# Patient Record
Sex: FEMALE | Race: WHITE | NOT HISPANIC OR LATINO | Employment: OTHER | URBAN - METROPOLITAN AREA
[De-identification: names, ages, dates, MRNs, and addresses within clinical notes are randomized per-mention and may not be internally consistent; named-entity substitution may affect disease eponyms.]

---

## 2017-11-09 ENCOUNTER — GENERIC CONVERSION - ENCOUNTER (OUTPATIENT)
Dept: OTHER | Facility: OTHER | Age: 57
End: 2017-11-09

## 2018-10-30 ENCOUNTER — APPOINTMENT (EMERGENCY)
Dept: CT IMAGING | Facility: HOSPITAL | Age: 58
End: 2018-10-30

## 2018-10-30 ENCOUNTER — HOSPITAL ENCOUNTER (EMERGENCY)
Facility: HOSPITAL | Age: 58
Discharge: HOME | End: 2018-10-30
Attending: EMERGENCY MEDICINE | Admitting: EMERGENCY MEDICINE

## 2018-10-30 VITALS
OXYGEN SATURATION: 97 % | BODY MASS INDEX: 29.23 KG/M2 | HEART RATE: 81 BPM | SYSTOLIC BLOOD PRESSURE: 110 MMHG | TEMPERATURE: 98.7 F | RESPIRATION RATE: 18 BRPM | DIASTOLIC BLOOD PRESSURE: 57 MMHG | WEIGHT: 165 LBS

## 2018-10-30 DIAGNOSIS — M54.9 BACK PAIN: Primary | ICD-10-CM

## 2018-10-30 LAB
ALBUMIN SERPL BCP-MCNC: 3.7 G/DL (ref 3.5–5)
ALP SERPL-CCNC: 122 U/L (ref 46–116)
ALT SERPL W P-5'-P-CCNC: 16 U/L (ref 12–78)
ANION GAP SERPL CALCULATED.3IONS-SCNC: 8 MMOL/L (ref 4–13)
APTT PPP: 29 SECONDS (ref 24–36)
AST SERPL W P-5'-P-CCNC: 14 U/L (ref 5–45)
BASOPHILS # BLD AUTO: 0.04 THOUSANDS/ΜL (ref 0–0.1)
BASOPHILS NFR BLD AUTO: 0 % (ref 0–1)
BILIRUB SERPL-MCNC: 0.1 MG/DL (ref 0.2–1)
BUN SERPL-MCNC: 18 MG/DL (ref 5–25)
CALCIUM SERPL-MCNC: 9.6 MG/DL (ref 8.3–10.1)
CHLORIDE SERPL-SCNC: 105 MMOL/L (ref 100–108)
CO2 SERPL-SCNC: 30 MMOL/L (ref 21–32)
CREAT SERPL-MCNC: 0.71 MG/DL (ref 0.6–1.3)
EOSINOPHIL # BLD AUTO: 0.31 THOUSAND/ΜL (ref 0–0.61)
EOSINOPHIL NFR BLD AUTO: 2 % (ref 0–6)
ERYTHROCYTE [DISTWIDTH] IN BLOOD BY AUTOMATED COUNT: 13.2 % (ref 11.6–15.1)
GFR SERPL CREATININE-BSD FRML MDRD: 96 ML/MIN/1.73SQ M
GLUCOSE SERPL-MCNC: 94 MG/DL (ref 65–140)
HCT VFR BLD AUTO: 43.1 % (ref 34.8–46.1)
HGB BLD-MCNC: 14.5 G/DL (ref 11.5–15.4)
IMM GRANULOCYTES # BLD AUTO: 0.05 THOUSAND/UL (ref 0–0.2)
IMM GRANULOCYTES NFR BLD AUTO: 0 % (ref 0–2)
INR PPP: 0.91 (ref 0.86–1.17)
LIPASE SERPL-CCNC: 112 U/L (ref 73–393)
LYMPHOCYTES # BLD AUTO: 3.41 THOUSANDS/ΜL (ref 0.6–4.47)
LYMPHOCYTES NFR BLD AUTO: 21 % (ref 14–44)
MCH RBC QN AUTO: 30.9 PG (ref 26.8–34.3)
MCHC RBC AUTO-ENTMCNC: 33.6 G/DL (ref 31.4–37.4)
MCV RBC AUTO: 92 FL (ref 82–98)
MONOCYTES # BLD AUTO: 1.26 THOUSAND/ΜL (ref 0.17–1.22)
MONOCYTES NFR BLD AUTO: 8 % (ref 4–12)
NEUTROPHILS # BLD AUTO: 11.14 THOUSANDS/ΜL (ref 1.85–7.62)
NEUTS SEG NFR BLD AUTO: 69 % (ref 43–75)
NRBC BLD AUTO-RTO: 0 /100 WBCS
PLATELET # BLD AUTO: 305 THOUSANDS/UL (ref 149–390)
PMV BLD AUTO: 9.8 FL (ref 8.9–12.7)
POTASSIUM SERPL-SCNC: 3.8 MMOL/L (ref 3.5–5.3)
PROT SERPL-MCNC: 7.1 G/DL (ref 6.4–8.2)
PROTHROMBIN TIME: 12.2 SECONDS (ref 11.8–14.2)
RBC # BLD AUTO: 4.7 MILLION/UL (ref 3.81–5.12)
SODIUM SERPL-SCNC: 143 MMOL/L (ref 136–145)
WBC # BLD AUTO: 16.21 THOUSAND/UL (ref 4.31–10.16)

## 2018-10-30 PROCEDURE — 83690 ASSAY OF LIPASE: CPT | Performed by: EMERGENCY MEDICINE

## 2018-10-30 PROCEDURE — 85730 THROMBOPLASTIN TIME PARTIAL: CPT | Performed by: EMERGENCY MEDICINE

## 2018-10-30 PROCEDURE — 85025 COMPLETE CBC W/AUTO DIFF WBC: CPT | Performed by: EMERGENCY MEDICINE

## 2018-10-30 PROCEDURE — 99284 EMERGENCY DEPT VISIT MOD MDM: CPT

## 2018-10-30 PROCEDURE — 85610 PROTHROMBIN TIME: CPT | Performed by: EMERGENCY MEDICINE

## 2018-10-30 PROCEDURE — 74177 CT ABD & PELVIS W/CONTRAST: CPT

## 2018-10-30 PROCEDURE — 80053 COMPREHEN METABOLIC PANEL: CPT | Performed by: EMERGENCY MEDICINE

## 2018-10-30 PROCEDURE — 36415 COLL VENOUS BLD VENIPUNCTURE: CPT | Performed by: EMERGENCY MEDICINE

## 2018-10-30 RX ORDER — LIDOCAINE 50 MG/G
1 PATCH TOPICAL DAILY
Qty: 30 PATCH | Refills: 0 | Status: SHIPPED | OUTPATIENT
Start: 2018-10-30 | End: 2018-10-30

## 2018-10-30 RX ORDER — LIDOCAINE 50 MG/G
1 PATCH TOPICAL DAILY
Qty: 30 PATCH | Refills: 0 | Status: SHIPPED | OUTPATIENT
Start: 2018-10-30

## 2018-10-30 RX ADMIN — IOHEXOL 100 ML: 350 INJECTION, SOLUTION INTRAVENOUS at 22:34

## 2018-10-31 NOTE — DISCHARGE INSTRUCTIONS
Acute Low Back Pain   WHAT YOU NEED TO KNOW:   Acute low back pain is sudden discomfort in your lower back area that lasts for up to 6 weeks  The discomfort makes it difficult to tolerate activity  DISCHARGE INSTRUCTIONS:   Return to the emergency department if:   · You have severe pain  · You have sudden stiffness and heaviness on both buttocks down to both legs  · You have numbness or weakness in one leg, or pain in both legs  · You have numbness in your genital area or across your lower back  · You cannot control your urine or bowel movements  Contact your healthcare provider if:   · You have a fever  · You have pain at night or when you rest     · Your pain does not get better with treatment  · You have pain that worsens when you cough or sneeze  · You suddenly feel something pop or snap in your back  · You have questions or concerns about your condition or care  Medicines: The following medicines may be ordered by your healthcare provider:  · Acetaminophen  decreases pain  It is available without a doctor's order  Ask how much to take and how often to take it  Follow directions  Acetaminophen can cause liver damage if not taken correctly  · NSAIDs  help decrease swelling and pain  This medicine is available with or without a doctor's order  NSAIDs can cause stomach bleeding or kidney problems in certain people  If you take blood thinner medicine, always ask your healthcare provider if NSAIDs are safe for you  Always read the medicine label and follow directions  · Prescription pain medicine  may be given  Ask your healthcare provider how to take this medicine safely  · Muscle relaxers  decrease pain by relaxing the muscles in your lower spine  · Take your medicine as directed  Contact your healthcare provider if you think your medicine is not helping or if you have side effects  Tell him of her if you are allergic to any medicine   Keep a list of the medicines, vitamins, and herbs you take  Include the amounts, and when and why you take them  Bring the list or the pill bottles to follow-up visits  Carry your medicine list with you in case of an emergency  Self-care:   · Stay active  as much as you can without causing more pain  Bed rest could make your back pain worse  Start with some light exercises such as walking  Avoid heavy lifting until your pain is gone  Ask for more information about the activities or exercises that are right for you  · Ice  helps decrease swelling, pain, and muscle spams  Put crushed ice in a plastic bag  Cover it with a towel  Place it on your lower back for 20 to 30 minutes every 2 hours  Do this for about 2 to 3 days after your pain starts, or as directed  · Heat  helps decrease pain and muscle spasms  Start to use heat after treatment with ice has stopped  Use a small towel dampened with warm water or a heating pad, or sit in a warm bath  Apply heat on the area for 20 to 30 minutes every 2 hours for as many days as directed  Alternate heat and ice  Prevent acute low back pain:   · Use proper body mechanics  ¨ Bend at the hips and knees when you  objects  Do not bend from the waist  Use your leg muscles as you lift the load  Do not use your back  Keep the object close to your chest as you lift it  Try not to twist or lift anything above your waist     ¨ Change your position often when you stand for long periods of time  Rest one foot on a small box or footrest, and then switch to the other foot often  ¨ Try not to sit for long periods of time  When you do, sit in a straight-backed chair with your feet flat on the floor  Never reach, pull, or push while you are sitting  · Do exercises that strengthen your back muscles  Warm up before you exercise  Ask your healthcare provider the best exercises for you  · Maintain a healthy weight  Ask your healthcare provider how much you should weigh   Ask him to help you create a weight loss plan if you are overweight  Follow up with your healthcare provider as directed:  Return for a follow-up visit if you still have pain after 1 to 3 weeks of treatment  You may need to visit an orthopedist if your back pain lasts more than 12 weeks  Write down your questions so you remember to ask them during your visits  © 2017 [employer]  Information is for End User's use only and may not be sold, redistributed or otherwise used for commercial purposes  All illustrations and images included in CareNotes® are the copyrighted property of [employer]  or [employer]  The above information is an  only  It is not intended as medical advice for individual conditions or treatments  Talk to your doctor, nurse or pharmacist before following any medical regimen to see if it is safe and effective for you

## 2018-10-31 NOTE — ED PROVIDER NOTES
History  Chief Complaint   Patient presents with    Back Pain     Left sided back over the past few months  Worse at times  Several months progressively worsening L flank pain, intermittent, worse w bending and movement  No preceding trauma  No CP or dyspnea or hemoptysis  Pain radiates to L abdomen and low back  No numbness or weakness  No rash  No other symptoms  She is concerned she might have malignancy as etiology of pain (strong [facility] h/o malignancy) and therefore requesting evaluation for possible cancer  None       History reviewed  No pertinent past medical history  Past Surgical History:   Procedure Laterality Date    HEMORROIDECTOMY      VEIN LIGATION AND STRIPPING         History reviewed  No pertinent family history  I have reviewed and agree with the history as documented  Social History   Substance Use Topics    Smoking status: Current Every Day Smoker     Packs/day: 1 00    Smokeless tobacco: Never Used    Alcohol use No        Review of Systems   Musculoskeletal: Positive for back pain  All other systems reviewed and are negative  Physical Exam  Physical Exam   Constitutional: She is oriented to person, place, and time  She appears well-developed and well-nourished  No distress  HENT:   Head: Normocephalic and atraumatic  Eyes: Pupils are equal, round, and reactive to light  Conjunctivae and EOM are normal    Neck: Normal range of motion  Neck supple  No JVD present  Cardiovascular: Normal rate, regular rhythm, normal heart sounds and intact distal pulses  Pulmonary/Chest: Effort normal and breath sounds normal  No stridor  Abdominal: Soft  She exhibits no distension  There is no tenderness  There is no rebound and no guarding  L paraspinal tenderness in T and L spine region  No ecchymosis erythema crepitus or discoloration of skin  Musculoskeletal: Normal range of motion  She exhibits no edema, tenderness or deformity     Neurological: She is alert and oriented to person, place, and time  No cranial nerve deficit or sensory deficit  She exhibits normal muscle tone  Coordination normal    Skin: Skin is warm and dry  Capillary refill takes less than 2 seconds  No rash noted  She is not diaphoretic  No erythema  No pallor  Nursing note and vitals reviewed        Vital Signs  ED Triage Vitals [10/30/18 1949]   Temperature Pulse Respirations Blood Pressure SpO2   98 7 °F (37 1 °C) (!) 110 18 142/89 98 %      Temp Source Heart Rate Source Patient Position - Orthostatic VS BP Location FiO2 (%)   Oral Monitor Sitting Left arm --      Pain Score       Worst Possible Pain           Vitals:    10/30/18 1949 10/30/18 2248   BP: 142/89 110/57   Pulse: (!) 110 81   Patient Position - Orthostatic VS: Sitting Lying       Visual Acuity      ED Medications  Medications   iohexol (OMNIPAQUE) 350 MG/ML injection (MULTI-DOSE) 100 mL (100 mL Intravenous Given 10/30/18 2234)       Diagnostic Studies  Results Reviewed     Procedure Component Value Units Date/Time    Lipase [[id number]]  (Normal) Collected:  10/30/18 2121    Lab Status:  Final result Specimen:  Blood from Arm, Right Updated:  10/30/18 2222     Lipase 112 u/L     Comprehensive metabolic panel [[id number]]  (Abnormal) Collected:  10/30/18 2121    Lab Status:  Final result Specimen:  Blood from Arm, Right Updated:  10/30/18 2222     Sodium 143 mmol/L      Potassium 3 8 mmol/L      Chloride 105 mmol/L      CO2 30 mmol/L      ANION GAP 8 mmol/L      BUN 18 mg/dL      Creatinine 0 71 mg/dL      Glucose 94 mg/dL      Calcium 9 6 mg/dL      AST 14 U/L      ALT 16 U/L      Alkaline Phosphatase 122 (H) U/L      Total Protein 7 1 g/dL      Albumin 3 7 g/dL      Total Bilirubin 0 10 (L) mg/dL      eGFR 96 ml/min/1 73sq m     Narrative:         National Kidney Disease Education Program recommendations are as follows:  GFR calculation is accurate only with a steady state creatinine  Chronic Kidney disease less than 60 ml/min/1 73 sq  meters  Kidney failure less than 15 ml/min/1 73 sq  meters  Protime-INR [[id number]]  (Normal) Collected:  10/30/18 2121    Lab Status:  Final result Specimen:  Blood from Arm, Right Updated:  10/30/18 2138     Protime 12 2 seconds      INR 0 91    APTT [[id number]]  (Normal) Collected:  10/30/18 2121    Lab Status:  Final result Specimen:  Blood from Arm, Right Updated:  10/30/18 2138     PTT 29 seconds     CBC and differential [[id number]]  (Abnormal) Collected:  10/30/18 2121    Lab Status:  Final result Specimen:  Blood from Arm, Right Updated:  10/30/18 2127     WBC 16 21 (H) Thousand/uL      RBC 4 70 Million/uL      Hemoglobin 14 5 g/dL      Hematocrit 43 1 %      MCV 92 fL      MCH 30 9 pg      MCHC 33 6 g/dL      RDW 13 2 %      MPV 9 8 fL      Platelets 632 Thousands/uL      nRBC 0 /100 WBCs      Neutrophils Relative 69 %      Immat GRANS % 0 %      Lymphocytes Relative 21 %      Monocytes Relative 8 %      Eosinophils Relative 2 %      Basophils Relative 0 %      Neutrophils Absolute 11 14 (H) Thousands/µL      Immature Grans Absolute 0 05 Thousand/uL      Lymphocytes Absolute 3 41 Thousands/µL      Monocytes Absolute 1 26 (H) Thousand/µL      Eosinophils Absolute 0 31 Thousand/µL      Basophils Absolute 0 04 Thousands/µL                  CT abdomen pelvis with contrast   Final Result by Jenni Aviles DO (10/30 2257)      No acute findings  Workstation performed: [id number]                    Procedures  Procedures       Phone Contacts  ED Phone Contact    ED Course                               MDM  Number of Diagnoses or Management Options  Back pain:   Diagnosis management comments: Several months of back pain, concerned of malignancy given recent multiple family dxs of same  Labs and ct without emergent findings   Plan - d/c home, f/u pcp for further testing/imaging/etc         Amount and/or Complexity of Data Reviewed  Clinical lab tests: ordered and reviewed  Tests in the radiology section of CPT®: ordered and reviewed  Tests in the medicine section of CPT®: reviewed and ordered  Independent visualization of images, tracings, or specimens: yes      CritCare Time    Disposition  Final diagnoses:   Back pain     Time reflects when diagnosis was documented in both MDM as applicable and the Disposition within this note     Time User Action Codes Description Comment    10/30/2018 11:05 PM Nathen Bernardo Add [M54 9] Back pain       ED Disposition     ED Disposition Condition Comment    Discharge  Bronwyn Santos discharge to home/self care  Condition at discharge: Stable        Follow-up Information    None         There are no discharge medications for this patient  No discharge procedures on file      ED Provider  Electronically Signed by           Loren Rivas MD  10/31/18 1242

## 2024-08-11 ENCOUNTER — HOSPITAL ENCOUNTER (EMERGENCY)
Facility: HOSPITAL | Age: 64
Discharge: HOME | End: 2024-08-12

## 2024-08-11 ENCOUNTER — APPOINTMENT (EMERGENCY)
Dept: CT IMAGING | Facility: HOSPITAL | Age: 64
End: 2024-08-11

## 2024-08-11 VITALS
DIASTOLIC BLOOD PRESSURE: 53 MMHG | RESPIRATION RATE: 20 BRPM | BODY MASS INDEX: 35.27 KG/M2 | TEMPERATURE: 98.5 F | HEART RATE: 76 BPM | HEIGHT: 63 IN | WEIGHT: 199.08 LBS | OXYGEN SATURATION: 96 % | SYSTOLIC BLOOD PRESSURE: 105 MMHG

## 2024-08-11 DIAGNOSIS — J44.9 COPD (CHRONIC OBSTRUCTIVE PULMONARY DISEASE) (HCC): ICD-10-CM

## 2024-08-11 DIAGNOSIS — K59.00 CONSTIPATION: ICD-10-CM

## 2024-08-11 DIAGNOSIS — J04.0 LARYNGITIS: Primary | ICD-10-CM

## 2024-08-11 LAB
ALBUMIN SERPL BCG-MCNC: 4 G/DL (ref 3.5–5)
ALP SERPL-CCNC: 91 U/L (ref 34–104)
ALT SERPL W P-5'-P-CCNC: 15 U/L (ref 7–52)
ANION GAP SERPL CALCULATED.3IONS-SCNC: 6 MMOL/L (ref 4–13)
AST SERPL W P-5'-P-CCNC: 12 U/L (ref 13–39)
BASOPHILS # BLD AUTO: 0.02 THOUSANDS/ÂΜL (ref 0–0.1)
BASOPHILS NFR BLD AUTO: 0 % (ref 0–1)
BILIRUB SERPL-MCNC: 0.27 MG/DL (ref 0.2–1)
BILIRUB UR QL STRIP: NEGATIVE
BUN SERPL-MCNC: 22 MG/DL (ref 5–25)
CALCIUM SERPL-MCNC: 9 MG/DL (ref 8.4–10.2)
CARDIAC TROPONIN I PNL SERPL HS: 3 NG/L
CHLORIDE SERPL-SCNC: 104 MMOL/L (ref 96–108)
CLARITY UR: CLEAR
CO2 SERPL-SCNC: 28 MMOL/L (ref 21–32)
COLOR UR: YELLOW
CREAT SERPL-MCNC: 0.8 MG/DL (ref 0.6–1.3)
EOSINOPHIL # BLD AUTO: 0.05 THOUSAND/ÂΜL (ref 0–0.61)
EOSINOPHIL NFR BLD AUTO: 0 % (ref 0–6)
ERYTHROCYTE [DISTWIDTH] IN BLOOD BY AUTOMATED COUNT: 14 % (ref 11.6–15.1)
FLUAV RNA RESP QL NAA+PROBE: NEGATIVE
FLUBV RNA RESP QL NAA+PROBE: NEGATIVE
GFR SERPL CREATININE-BSD FRML MDRD: 79 ML/MIN/1.73SQ M
GLUCOSE SERPL-MCNC: 98 MG/DL (ref 65–140)
GLUCOSE UR STRIP-MCNC: NEGATIVE MG/DL
HCT VFR BLD AUTO: 40.7 % (ref 34.8–46.1)
HGB BLD-MCNC: 13.4 G/DL (ref 11.5–15.4)
HGB UR QL STRIP.AUTO: NEGATIVE
IMM GRANULOCYTES # BLD AUTO: 0.15 THOUSAND/UL (ref 0–0.2)
IMM GRANULOCYTES NFR BLD AUTO: 1 % (ref 0–2)
KETONES UR STRIP-MCNC: NEGATIVE MG/DL
LEUKOCYTE ESTERASE UR QL STRIP: NEGATIVE
LIPASE SERPL-CCNC: 125 U/L (ref 11–82)
LYMPHOCYTES # BLD AUTO: 3.19 THOUSANDS/ÂΜL (ref 0.6–4.47)
LYMPHOCYTES NFR BLD AUTO: 17 % (ref 14–44)
MCH RBC QN AUTO: 30.2 PG (ref 26.8–34.3)
MCHC RBC AUTO-ENTMCNC: 32.9 G/DL (ref 31.4–37.4)
MCV RBC AUTO: 92 FL (ref 82–98)
MONOCYTES # BLD AUTO: 1.64 THOUSAND/ÂΜL (ref 0.17–1.22)
MONOCYTES NFR BLD AUTO: 9 % (ref 4–12)
NEUTROPHILS # BLD AUTO: 13.25 THOUSANDS/ÂΜL (ref 1.85–7.62)
NEUTS SEG NFR BLD AUTO: 73 % (ref 43–75)
NITRITE UR QL STRIP: NEGATIVE
NRBC BLD AUTO-RTO: 0 /100 WBCS
PH UR STRIP.AUTO: 6.5 [PH]
PLATELET # BLD AUTO: 289 THOUSANDS/UL (ref 149–390)
PMV BLD AUTO: 9.3 FL (ref 8.9–12.7)
POTASSIUM SERPL-SCNC: 4.1 MMOL/L (ref 3.5–5.3)
PROT SERPL-MCNC: 6.4 G/DL (ref 6.4–8.4)
PROT UR STRIP-MCNC: NEGATIVE MG/DL
RBC # BLD AUTO: 4.44 MILLION/UL (ref 3.81–5.12)
RSV RNA RESP QL NAA+PROBE: NEGATIVE
SARS-COV-2 RNA RESP QL NAA+PROBE: NEGATIVE
SODIUM SERPL-SCNC: 138 MMOL/L (ref 135–147)
SP GR UR STRIP.AUTO: 1.02
TSH SERPL DL<=0.05 MIU/L-ACNC: 0.12 UIU/ML (ref 0.45–4.5)
UROBILINOGEN UR QL STRIP.AUTO: 0.2 E.U./DL
WBC # BLD AUTO: 18.3 THOUSAND/UL (ref 4.31–10.16)

## 2024-08-11 PROCEDURE — 84484 ASSAY OF TROPONIN QUANT: CPT

## 2024-08-11 PROCEDURE — 93005 ELECTROCARDIOGRAM TRACING: CPT

## 2024-08-11 PROCEDURE — 74177 CT ABD & PELVIS W/CONTRAST: CPT

## 2024-08-11 PROCEDURE — 84439 ASSAY OF FREE THYROXINE: CPT

## 2024-08-11 PROCEDURE — 83690 ASSAY OF LIPASE: CPT

## 2024-08-11 PROCEDURE — 84443 ASSAY THYROID STIM HORMONE: CPT

## 2024-08-11 PROCEDURE — 80053 COMPREHEN METABOLIC PANEL: CPT

## 2024-08-11 PROCEDURE — 36415 COLL VENOUS BLD VENIPUNCTURE: CPT

## 2024-08-11 PROCEDURE — 99285 EMERGENCY DEPT VISIT HI MDM: CPT

## 2024-08-11 PROCEDURE — 0241U HB NFCT DS VIR RESP RNA 4 TRGT: CPT

## 2024-08-11 PROCEDURE — 94640 AIRWAY INHALATION TREATMENT: CPT

## 2024-08-11 PROCEDURE — 70491 CT SOFT TISSUE NECK W/DYE: CPT

## 2024-08-11 PROCEDURE — 81003 URINALYSIS AUTO W/O SCOPE: CPT

## 2024-08-11 PROCEDURE — 71260 CT THORAX DX C+: CPT

## 2024-08-11 PROCEDURE — 85025 COMPLETE CBC W/AUTO DIFF WBC: CPT

## 2024-08-11 RX ORDER — IPRATROPIUM BROMIDE AND ALBUTEROL SULFATE 2.5; .5 MG/3ML; MG/3ML
3 SOLUTION RESPIRATORY (INHALATION) ONCE
Status: COMPLETED | OUTPATIENT
Start: 2024-08-11 | End: 2024-08-11

## 2024-08-11 RX ADMIN — IOHEXOL 100 ML: 350 INJECTION, SOLUTION INTRAVENOUS at 22:39

## 2024-08-11 RX ADMIN — IPRATROPIUM BROMIDE AND ALBUTEROL SULFATE 3 ML: 2.5; .5 SOLUTION RESPIRATORY (INHALATION) at 22:03

## 2024-08-12 LAB
ATRIAL RATE: 81 BPM
P AXIS: 35 DEGREES
PR INTERVAL: 136 MS
QRS AXIS: 68 DEGREES
QRSD INTERVAL: 78 MS
QT INTERVAL: 374 MS
QTC INTERVAL: 434 MS
T WAVE AXIS: 61 DEGREES
T4 FREE SERPL-MCNC: 0.79 NG/DL (ref 0.61–1.12)
VENTRICULAR RATE: 81 BPM

## 2024-08-12 PROCEDURE — 93010 ELECTROCARDIOGRAM REPORT: CPT | Performed by: INTERNAL MEDICINE

## 2024-08-12 NOTE — ED PROVIDER NOTES
"History  Chief Complaint   Patient presents with    Sore Throat     Started about 6 weeks ago. C/O sore throat, chest tightness, ear pain abd pain, back pain and dizziness. No chills, no fevers.      HPI    Patient is a 63 year old female with PMHx cervical cancer, anxiety, DVT, Depression, HLD, HTN, presenting to the ED for evaluation of multiple complaints. Patient states that for the past 2-3 months, she has been having intermittent sore throat and throat tightening sensation \"as if there is something stuck there\", with persistent nasal congestion and dry cough. She also feels associated chest tightness that is unrelated to exertion, with intermittent shortness of breath. Patient also notes in the same time frame having intermittent diffuse abdominal pain and bloating. She reports difficulty with bowel movements and also notes difficulty urinating at times. Reports chronic history of back pain. Feels lightheaded at times as well. She also notes bilateral ear pressure. Patient states that she has not been to her family doctor for any of these symptoms. Her sister at bedside encouraged her to be seen in the ED tonight because \"I didn't want her to have a heart attack in her sleep.\" Patient states that she has been putting off getting evaluated for these complaints because she is nervous.    Specifically patient denies - fevers, chills, pleuritic component to her chest tightness or shortness of breath, trauma, recent falls, difficulty swallowing, difficulty speaking, headaches, vision changes, neck pain, bloody stools, melena, hematuria, vaginal bleeding, peripheral edema, calf pain or swelling, urinary frequency and urgency, paresthesias, focal extremity weakness.    Prior to Admission Medications   Prescriptions Last Dose Informant Patient Reported? Taking?   lidocaine (LIDODERM) 5 %   No No   Sig: Apply 1 patch topically daily Remove & Discard patch within 12 hours or as directed by MD      Facility-Administered " Medications: None       History reviewed. No pertinent past medical history.    Past Surgical History:   Procedure Laterality Date    HEMORROIDECTOMY      VEIN LIGATION AND STRIPPING         Family History   Problem Relation Age of Onset    Heart disease Father     Lung cancer Father      I have reviewed and agree with the history as documented.    E-Cigarette/Vaping    E-Cigarette Use Never User      E-Cigarette/Vaping Substances    Nicotine No     THC No     CBD No     Flavoring No     Other No     Unknown No      Social History     Tobacco Use    Smoking status: Every Day     Current packs/day: 1.00     Types: Cigarettes    Smokeless tobacco: Never   Vaping Use    Vaping status: Never Used   Substance Use Topics    Alcohol use: No    Drug use: Never       Review of Systems   All other systems reviewed and are negative.      Physical Exam  Physical Exam  Vitals and nursing note reviewed.   Constitutional:       General: She is not in acute distress.     Appearance: She is well-developed. She is obese. She is not ill-appearing, toxic-appearing or diaphoretic.   HENT:      Head: Normocephalic and atraumatic.      Right Ear: Tympanic membrane and ear canal normal.      Left Ear: Tympanic membrane and ear canal normal.      Nose: Congestion present. No rhinorrhea.      Mouth/Throat:      Mouth: Mucous membranes are moist.      Pharynx: Oropharynx is clear. Uvula midline. No pharyngeal swelling, oropharyngeal exudate, posterior oropharyngeal erythema or uvula swelling.      Tonsils: No tonsillar exudate or tonsillar abscesses.      Comments: Floor of the mouth is soft    Eyes:      Extraocular Movements:      Right eye: Normal extraocular motion.      Left eye: Normal extraocular motion.      Conjunctiva/sclera: Conjunctivae normal.   Neck:      Comments: No palpable masses or soft tissue edema. No tenderness to soft tissues of neck. Trachea is midline    Cardiovascular:      Rate and Rhythm: Normal rate and regular  rhythm.      Heart sounds: Normal heart sounds. No murmur heard.  Pulmonary:      Effort: Pulmonary effort is normal. No respiratory distress.      Breath sounds: Wheezing (upper) present.   Abdominal:      General: There is distension (mild).      Palpations: Abdomen is soft. There is no mass.      Tenderness: There is abdominal tenderness (diffuse non-focal). There is no guarding or rebound.      Hernia: No hernia is present.   Musculoskeletal:         General: No swelling.      Cervical back: Normal range of motion and neck supple.   Lymphadenopathy:      Cervical: No cervical adenopathy.   Skin:     General: Skin is warm and dry.      Capillary Refill: Capillary refill takes less than 2 seconds.      Findings: No erythema.   Neurological:      General: No focal deficit present.      Mental Status: She is alert and oriented to person, place, and time.   Psychiatric:         Mood and Affect: Mood normal.         Vital Signs  ED Triage Vitals [08/11/24 2127]   Temperature Pulse Respirations Blood Pressure SpO2   98.5 °F (36.9 °C) 93 20 159/76 97 %      Temp Source Heart Rate Source Patient Position - Orthostatic VS BP Location FiO2 (%)   Temporal Monitor Sitting Left arm --      Pain Score       1           Vitals:    08/11/24 2127 08/11/24 2317   BP: 159/76 105/53   Pulse: 93 76   Patient Position - Orthostatic VS: Sitting Sitting         Visual Acuity      ED Medications  Medications   ipratropium-albuterol (DUO-NEB) 0.5-2.5 mg/3 mL inhalation solution 3 mL (3 mL Nebulization Given 8/11/24 2203)   iohexol (OMNIPAQUE) 350 MG/ML injection (MULTI-DOSE) 100 mL (100 mL Intravenous Given 8/11/24 2239)       Diagnostic Studies  Results Reviewed       Procedure Component Value Units Date/Time    UA w Reflex to Microscopic w Reflex to Culture [[id number]]  (Normal) Collected: 08/11/24 2230    Lab Status: Final result Specimen: Urine, Clean Catch Updated: 08/11/24 2245     Color, UA Yellow     Clarity, UA Clear     Specific  Gravity, UA 1.025     pH, UA 6.5     Leukocytes, UA Negative     Nitrite, UA Negative     Protein, UA Negative mg/dl      Glucose, UA Negative mg/dl      Ketones, UA Negative mg/dl      Urobilinogen, UA 0.2 E.U./dl      Bilirubin, UA Negative     Occult Blood, UA Negative    FLU/RSV/COVID - if FLU/RSV clinically relevant [[id number]]  (Normal) Collected: 08/11/24 2152    Lab Status: Final result Specimen: Nares from Nose Updated: 08/11/24 2239     SARS-CoV-2 Negative     INFLUENZA A PCR Negative     INFLUENZA B PCR Negative     RSV PCR Negative    Narrative:      FOR PEDIATRIC PATIENTS - copy/paste COVID Guidelines URL to browser: [email or web address]    SARS-CoV-2 assay is a Nucleic Acid Amplification assay intended for the  qualitative detection of nucleic acid from SARS-CoV-2 in nasopharyngeal  swabs. Results are for the presumptive identification of SARS-CoV-2 RNA.    Positive results are indicative of infection with SARS-CoV-2, the virus  causing COVID-19, but do not rule out bacterial infection or co-infection  with other viruses. Laboratories within the [place] and its  territories are required to report all positive results to the appropriate  public health authorities. Negative results do not preclude SARS-CoV-2  infection and should not be used as the sole basis for treatment or other  patient management decisions. Negative results must be combined with  clinical observations, patient history, and epidemiological information.  This test has not been FDA cleared or approved.    This test has been authorized by FDA under an Emergency Use Authorization  (EUA). This test is only authorized for the duration of time the  declaration that circumstances exist justifying the authorization of the  emergency use of an in vitro diagnostic tests for detection of SARS-CoV-2  virus and/or diagnosis of COVID-19 infection under section 564(b)(1) of  the Act, 21  U.S.C. 360bbb-3(b)(1), unless the authorization is terminated  or revoked sooner. The test has been validated but independent review by FDA  and CLIA is pending.    Test performed using [employer]: This RT-PCR assay targets N2,  a region unique to SARS-CoV-2. A conserved region in the E-gene was chosen  for pan-Sarbecovirus detection which includes SARS-CoV-2.    According to CMS-2020-01-R, this platform meets the definition of high-throughput technology.    TSH, 3rd generation with Free T4 reflex [[id number]]  (Abnormal) Collected: 08/11/24 2152    Lab Status: Final result Specimen: Blood from Arm, Left Updated: 08/11/24 2234     TSH 3RD GENERATON 0.120 uIU/mL     T4, free [[id number]] Collected: 08/11/24 2152    Lab Status: In process Specimen: Blood from Arm, Left Updated: 08/11/24 2233    HS Troponin 0hr (reflex protocol) [[id number]]  (Normal) Collected: 08/11/24 2152    Lab Status: Final result Specimen: Blood from Arm, Left Updated: 08/11/24 2223     hs TnI 0hr 3 ng/L     Comprehensive metabolic panel [[id number]]  (Abnormal) Collected: 08/11/24 2152    Lab Status: Final result Specimen: Blood from Arm, Left Updated: 08/11/24 2218     Sodium 138 mmol/L      Potassium 4.1 mmol/L      Chloride 104 mmol/L      CO2 28 mmol/L      ANION GAP 6 mmol/L      BUN 22 mg/dL      Creatinine 0.80 mg/dL      Glucose 98 mg/dL      Calcium 9.0 mg/dL      AST 12 U/L      ALT 15 U/L      Alkaline Phosphatase 91 U/L      Total Protein 6.4 g/dL      Albumin 4.0 g/dL      Total Bilirubin 0.27 mg/dL      eGFR 79 ml/min/1.73sq m     Narrative:      National Kidney Disease Foundation guidelines for Chronic Kidney Disease (CKD):     Stage 1 with normal or high GFR (GFR > 90 mL/min/1.73 square meters)    Stage 2 Mild CKD (GFR = 60-89 mL/min/1.73 square meters)    Stage 3A Moderate CKD (GFR = 45-59 mL/min/1.73 square meters)    Stage 3B Moderate CKD (GFR = 30-44 mL/min/1.73 square meters)    Stage 4 Severe CKD (GFR = 15-29 mL/min/1.73  "square meters)    Stage 5 End Stage CKD (GFR <15 mL/min/1.73 square meters)  Note: GFR calculation is accurate only with a steady state creatinine    Lipase [[id number]]  (Abnormal) Collected: 08/11/24 2152    Lab Status: Final result Specimen: Blood from Arm, Left Updated: 08/11/24 2218     Lipase 125 u/L     CBC and differential [[id number]]  (Abnormal) Collected: 08/11/24 2152    Lab Status: Final result Specimen: Blood from Arm, Left Updated: 08/11/24 2159     WBC 18.30 Thousand/uL      RBC 4.44 Million/uL      Hemoglobin 13.4 g/dL      Hematocrit 40.7 %      MCV 92 fL      MCH 30.2 pg      MCHC 32.9 g/dL      RDW 14.0 %      MPV 9.3 fL      Platelets 289 Thousands/uL      nRBC 0 /100 WBCs      Segmented % 73 %      Immature Grans % 1 %      Lymphocytes % 17 %      Monocytes % 9 %      Eosinophils Relative 0 %      Basophils Relative 0 %      Absolute Neutrophils 13.25 Thousands/µL      Absolute Immature Grans 0.15 Thousand/uL      Absolute Lymphocytes 3.19 Thousands/µL      Absolute Monocytes 1.64 Thousand/µL      Eosinophils Absolute 0.05 Thousand/µL      Basophils Absolute 0.02 Thousands/µL                    CT soft tissue neck with contrast    (Results Pending)   CT chest abdomen pelvis w contrast    (Results Pending)              Procedures  Procedures         ED Course  ED Course as of 08/12/24 0225   Sun Aug 11, 2024   2205 WBC(!): 18.30  Nonspecific - patient is afebrile. Prior records indicate WBC 16K 5 years ago.    Patient states that she is taking steroids daily for the past week because of the \"inflammation in my chest.\" Patient states it is Prednisone - unsure of the dose.        2209 EKG: NSR at 81 bpm, normal axis, normal intervals, no acute ischemic changes as interpreted by me. No prior EKG for comparison.   2220 LIPASE(!): 125  Nonspecific; no suspicion for pancreatitis     2249 UA w Reflex to Microscopic w Reflex to Culture  Negative for infection     2249 Comprehensive metabolic panel(!)  No " acute abnormalities     2249 TSH 3RD [facility](!): 0.120  Patient believes that she as diagnosed with thyroid issue previously, but is not currently on medications.   Mon Aug 12, 2024   0034 CT reports reviewed with patient from [facility].    Referrals made to Pulmonology and PCP.    I reviewed all testing with the patient:  I gave oral return precautions for what to return for in addition to the written return precautions.   The patient and sister verbalized understanding of the discharge instructions and warnings that would necessitate return to the Emergency Department.  I specifically highlighted areas of special concern regarding the written and verbal discharge instructions and return precautions.    All questions were answered prior to discharge.         Patient is a 63 year old female presenting to the ED for evaluation of multiple complaints. History and clinical exam documented above. Testing ordered to r/o metabolic derangement, anemia, thyroid abnormality. CT of the soft tissue neck ordered as patient having foreign body sensation in the throat, and CT AP ordered because patient having hx of abdominal pain and bloating. Cardiac workup ordered as patient complaining of chest tightness. With smoking hx, included the chest in CT as well to r/o any possible malignancy or pulmonary abnormalities.    Diagnostics reviewed as above. Patient given Nebulizer with improvement of wheezing afterward. Remained hemodynamically stable during ED course. See ED course for subsequent interpretation of lab findings, and re-evaluation. Patient ultimately discharged home.                          SBIRT 22yo+      Flowsheet Row Most Recent Value   Initial Alcohol Screen: US AUDIT-C     1. How often do you have a drink containing alcohol? 0 Filed at: 08/11/2024 2127   2. How many drinks containing alcohol do you have on a typical day you are drinking?  0 Filed at: 08/11/2024 2127   3a. Male UNDER 65: How often do you have five or  more drinks on one occasion? 0 Filed at: 08/11/2024 2127   3b. FEMALE Any Age, or MALE 65+: How often do you have 4 or more drinks on one occassion? 0 Filed at: 08/11/2024 2127   Audit-C Score 0 Filed at: 08/11/2024 2127   ART: How many times in the past year have you...    Used an illegal drug or used a prescription medication for non-medical reasons? Never Filed at: 08/11/2024 2127                      Medical Decision Making  Amount and/or Complexity of Data Reviewed  Labs: ordered. Decision-making details documented in ED Course.  Radiology: ordered.    Risk  Prescription drug management.                 Disposition  Final diagnoses:   Laryngitis   Constipation   concern for COPD     Time reflects when diagnosis was documented in both MDM as applicable and the Disposition within this note       Time User Action Codes Description Comment    8/12/2024 12:58 AM Kenyon Chambers [J04.0] Laryngitis     8/12/2024 12:58 AM Kenyon Chambers [K59.00] Constipation     8/12/2024 12:59 AM Kenyon Chambers Add [J44.9] COPD (chronic obstructive pulmonary disease) (HCC)     8/12/2024  1:00 AM Kenyon Chambers Modify [J44.9] concern for COPD           ED Disposition       ED Disposition   Discharge    Condition   Stable    Date/Time   Mon Aug 12, 2024 0058    Comment   Awilda Saul discharge to home/self care.                   Follow-up Information    None         Discharge Medication List as of 8/12/2024  1:00 AM        CONTINUE these medications which have NOT CHANGED    Details   lidocaine (LIDODERM) 5 % Apply 1 patch topically daily Remove & Discard patch within 12 hours or as directed by MD, Starting Tue 10/30/2018, Print                 PDMP Review       None            ED Provider  Electronically Signed by             Kenyon Chambers,   08/12/24 0225

## 2024-08-12 NOTE — DISCHARGE INSTRUCTIONS
Use stool softeners to relieve constipation.     Recommend following up per referrals provided.    Return to the ED with any new/concerning issues.     Tylenol and Motrin for discomfort as needed. Mucinex for cough and congestion as needed.

## 2024-08-14 ENCOUNTER — CONSULT (OUTPATIENT)
Dept: PULMONOLOGY | Facility: CLINIC | Age: 64
End: 2024-08-14

## 2024-08-14 ENCOUNTER — TELEPHONE (OUTPATIENT)
Age: 64
End: 2024-08-14

## 2024-08-14 VITALS
OXYGEN SATURATION: 97 % | BODY MASS INDEX: 34.38 KG/M2 | HEIGHT: 63 IN | WEIGHT: 194 LBS | SYSTOLIC BLOOD PRESSURE: 108 MMHG | DIASTOLIC BLOOD PRESSURE: 66 MMHG | HEART RATE: 112 BPM | TEMPERATURE: 97.8 F

## 2024-08-14 DIAGNOSIS — J44.9 COPD (CHRONIC OBSTRUCTIVE PULMONARY DISEASE) (HCC): Primary | ICD-10-CM

## 2024-08-14 DIAGNOSIS — J40 TRACHEOBRONCHITIS: ICD-10-CM

## 2024-08-14 DIAGNOSIS — J43.9 PULMONARY EMPHYSEMA, UNSPECIFIED EMPHYSEMA TYPE (HCC): ICD-10-CM

## 2024-08-14 DIAGNOSIS — J84.10 PULMONARY FIBROSIS (HCC): ICD-10-CM

## 2024-08-14 DIAGNOSIS — F17.200 TOBACCO DEPENDENCE: ICD-10-CM

## 2024-08-14 PROCEDURE — 99244 OFF/OP CNSLTJ NEW/EST MOD 40: CPT | Performed by: INTERNAL MEDICINE

## 2024-08-14 RX ORDER — ALBUTEROL SULFATE 90 UG/1
2 AEROSOL, METERED RESPIRATORY (INHALATION) EVERY 6 HOURS PRN
Qty: 18 G | Refills: 1 | Status: SHIPPED | OUTPATIENT
Start: 2024-08-14

## 2024-08-14 RX ORDER — BUDESONIDE, GLYCOPYRROLATE, AND FORMOTEROL FUMARATE 160; 9; 4.8 UG/1; UG/1; UG/1
2 AEROSOL, METERED RESPIRATORY (INHALATION) 2 TIMES DAILY
Qty: 10.7 G | Refills: 2 | Status: SHIPPED | OUTPATIENT
Start: 2024-08-14 | End: 2024-08-14 | Stop reason: CLARIF

## 2024-08-14 RX ORDER — ALBUTEROL SULFATE 90 UG/1
2 AEROSOL, METERED RESPIRATORY (INHALATION) EVERY 6 HOURS PRN
Qty: 18 G | Refills: 1 | Status: SHIPPED | OUTPATIENT
Start: 2024-08-14 | End: 2024-08-14

## 2024-08-14 RX ORDER — BUDESONIDE, GLYCOPYRROLATE, AND FORMOTEROL FUMARATE 160; 9; 4.8 UG/1; UG/1; UG/1
2 AEROSOL, METERED RESPIRATORY (INHALATION) 2 TIMES DAILY
Qty: 10.7 G | Refills: 2 | Status: SHIPPED | OUTPATIENT
Start: 2024-08-14

## 2024-08-14 RX ORDER — AZITHROMYCIN 250 MG/1
TABLET, FILM COATED ORAL
Qty: 6 TABLET | Refills: 0 | Status: SHIPPED | OUTPATIENT
Start: 2024-08-14 | End: 2024-08-18

## 2024-08-14 RX ORDER — AZITHROMYCIN 250 MG/1
TABLET, FILM COATED ORAL
Qty: 6 TABLET | Refills: 0 | Status: SHIPPED | OUTPATIENT
Start: 2024-08-14 | End: 2024-08-14 | Stop reason: CLARIF

## 2024-08-14 NOTE — TELEPHONE ENCOUNTER
Pt calling in. She wanted to inform DR. Walker that she got the Albuterol inhaler and the Azithromycin from the pharmacy, however did not  the Beztri as it cost around $800 and she does not have insurance to cover that at the moment.       She would like to know if there is a different inhaler that can be called in or if there are possibly samples for her to have.         Please advise. If new script [facility]

## 2024-08-14 NOTE — PROGRESS NOTES
Ambulatory Visit  Name: Awilda Saul      : 1960      MRN: [id number]  Encounter Provider: Florecita Walker MD  Encounter Date: 2024   Encounter department: [facility] PULMONARY & [facility]    Assessment & Plan   1. concern for COPD  Assessment & Plan:  Ms.Sheila Saul has emphysema from her smoking.  Currently her exercise tolerance was about 2 blocks.  She had cough with yellow phlegm.  She had wheezing.  On clinical examination, her chest was clear to auscultation.  The review of her CT scan showed evidence of structural emphysema and fibrosis. I have ordered a PFT.  I counseled her regarding smoking.  I have advised her to use Breztri regularly morning and evening.  I have reminded her to rinse mouth and gargle throat after using Breztri.  I have also prescribed albuterol  to be used as a rescue inhaler.  She was recently taking steroid tablet.  Her blood work showed some leukocytosis.  She may have tracheobronchitis and I prescribed her a course of azithromycin.  I had a long discussion with her and answered all her questions.  Orders:  -     Ambulatory Referral to Pulmonology  2. Tobacco dependence  Assessment & Plan:  He has been a smoker since age 16 and has been smoking about a pack a day.  She states that she quit few days back.  She seems somewhat motivated to remain quit.  I counseled her to remain quit.  I offered her help.  3. Pulmonary fibrosis (HCC)  Assessment & Plan:  Her CT scan of the chest from 2024 showed reticular and fibrotic changes bilaterally along with some honeycombing at the right lung base.  Etiology is not clear at this point.  She has history of dry mouth, joint pains and back pain.  She needs a connective tissue workup.  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  COPD emphysema.  I ordered a PFT.  Orders:  -     Complete PFT with post Bronchodilator and Six Minute walk; Future  -     INTERSTITIAL LUNG  DISEASE PANEL; Future  4. Pulmonary emphysema, unspecified emphysema type (HCC)  Assessment & Plan:  Ms.Sheila Saul has emphysema from her smoking.  Currently her exercise tolerance was about 2 blocks.  She had cough with yellow phlegm.  She had wheezing.  On clinical examination, her chest was clear to auscultation.  The review of her CT scan showed evidence of structural emphysema and fibrosis. I have ordered a PFT.  I counseled her regarding smoking.  I have advised her to use Breztri regularly morning and evening.  I have reminded her to rinse mouth and gargle throat after using Breztri.  I have also prescribed albuterol  to be used as a rescue inhaler.  She was recently taking steroid tablet.  Her blood work showed some leukocytosis.  She may have tracheobronchitis and I prescribed her a course of azithromycin.  I had a long discussion with her and answered all her questions.  Orders:  -     Complete PFT with post Bronchodilator and Six Minute walk; Future  -     albuterol (Ventolin HFA) 90 mcg/act inhaler; Inhale 2 puffs every 6 (six) hours as needed for wheezing or shortness of breath  -     Budeson-Glycopyrrol-Formoterol (Breztri Aerosphere) 160-9-4.8 MCG/ACT AERO; Inhale 2 puffs 2 (two) times a day Rinse mouth after use.  5. Tracheobronchitis  Assessment & Plan:  She has cough with yellow phlegm shortness of breath and wheeze.  She recently took steroid.  I have started her on a course of azithromycin.  Orders:  -     azithromycin (ZITHROMAX) 250 mg tablet; Take 2 tablets today then 1 tablet daily x 4 days      History of Present Illness       Awilda Saul is a 63 y.o. female who has been referred for possible COPD and pulmonary fibrosis.  She has been a smoker since age 16 smoking about a pack a day.  She states that she quit few days back.  She has cough with yellow phlegm and has wheezing.  She has been using Breztri on an as-needed basis which was given to her by her relative.  Recently she took some  steroid tablets for her shortness of breath and cough.  Her blood work showed some leukocytosis.  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  She had a CT scan of the chest and it showed evidence of structural emphysema.  She also had fibrotic changes in the peripheries.  She had some honeycombing at the right lung base.  He is suspected to have pulmonary fibrosis.  She has history of dry mouth joint pains and back pain.  She has been suspected to have Sjogren syndrome.  She has not been seen by a rheumatologist.  She does not take any steroid on a regular basis.  Currently her exercise tolerance is at least 2 blocks.  She denied any fever or chills.  She also mentioned many GI problems.  She mentioned IBS and autoimmune disorders.  Currently she does not have any insurance.  She has applied for Medicaid.  She has not had any recent PFT.    Review of Systems   Constitutional:  Positive for fatigue. Negative for appetite change, chills and fever.   HENT:  Positive for trouble swallowing. Negative for hearing loss, rhinorrhea, sinus pain and sneezing.    Respiratory:  Positive for cough, shortness of breath (ET  one block) and wheezing. Negative for chest tightness.    Cardiovascular:  Negative for chest pain, palpitations and leg swelling.   Gastrointestinal:  Positive for diarrhea. Negative for abdominal pain, constipation, nausea and vomiting.   Genitourinary:  Positive for urgency. Negative for dysuria and frequency.   Musculoskeletal:  Positive for arthralgias and back pain. Negative for gait problem.   Skin:  Negative for rash.   Allergic/Immunologic: Negative for environmental allergies.   Neurological:  Positive for light-headedness and headaches. Negative for dizziness and syncope.   Psychiatric/Behavioral:  Positive for sleep disturbance. Negative for agitation and confusion. The patient is nervous/anxious.        Objective     /66 (BP Location: Left arm,  "Patient Position: Sitting, Cuff Size: Standard)   Pulse (!) 112   Temp 97.8 °F (36.6 °C) (Tympanic)   Ht 5' 3\" (1.6 m)   Wt 88 kg (194 lb)   SpO2 97%   BMI 34.37 kg/m²     Physical Exam  Vitals reviewed.   Constitutional:       General: She is not in acute distress.     Appearance: She is obese. She is not ill-appearing, toxic-appearing or diaphoretic.   HENT:      Head: Normocephalic.      Mouth/Throat:      Mouth: Mucous membranes are moist.      Comments: Mallampatti Class 3  Eyes:      General: No scleral icterus.     Conjunctiva/sclera: Conjunctivae normal.   Cardiovascular:      Rate and Rhythm: Normal rate and regular rhythm.      Heart sounds: Normal heart sounds. No murmur heard.  Pulmonary:      Effort: Pulmonary effort is normal. No respiratory distress.      Breath sounds: Normal breath sounds. No stridor. No wheezing, rhonchi or rales.   Chest:      Chest wall: No tenderness.   Abdominal:      General: Bowel sounds are normal.      Palpations: Abdomen is soft.      Tenderness: There is no abdominal tenderness. There is no guarding.   Musculoskeletal:      Cervical back: Neck supple. No rigidity.      Right lower leg: No edema.      Left lower leg: No edema.   Lymphadenopathy:      Cervical: No cervical adenopathy.   Skin:     Coloration: Skin is not jaundiced or pale.      Findings: No rash.   Neurological:      Mental Status: She is alert and oriented to person, place, and time.      Gait: Gait normal.   Psychiatric:         Mood and Affect: Mood normal.         Behavior: Behavior normal.         Thought Content: Thought content normal.         Judgment: Judgment normal.       Administrative Statements           "

## 2024-08-14 NOTE — ASSESSMENT & PLAN NOTE
Ms.Sheila Saul has emphysema from her smoking.  Currently her exercise tolerance was about 2 blocks.  She had cough with yellow phlegm.  She had wheezing.  On clinical examination, her chest was clear to auscultation.  The review of her CT scan showed evidence of structural emphysema and fibrosis. I have ordered a PFT.  I counseled her regarding smoking.  I have advised her to use Breztri regularly morning and evening.  I have reminded her to rinse mouth and gargle throat after using Breztri.  I have also prescribed albuterol  to be used as a rescue inhaler.  She was recently taking steroid tablet.  Her blood work showed some leukocytosis.  She may have tracheobronchitis and I prescribed her a course of azithromycin.  I had a long discussion with her and answered all her questions.

## 2024-08-15 NOTE — ASSESSMENT & PLAN NOTE
She has cough with yellow phlegm shortness of breath and wheeze.  She recently took steroid.  I have started her on a course of azithromycin.

## 2024-08-15 NOTE — ASSESSMENT & PLAN NOTE
He has been a smoker since age 16 and has been smoking about a pack a day.  She states that she quit few days back.  She seems somewhat motivated to remain quit.  I counseled her to remain quit.  I offered her help.

## 2024-08-15 NOTE — ASSESSMENT & PLAN NOTE
Her CT scan of the chest from 8/11/2024 showed reticular and fibrotic changes bilaterally along with some honeycombing at the right lung base.  Etiology is not clear at this point.  She has history of dry mouth, joint pains and back pain.  She needs a connective tissue workup.  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  COPD emphysema.  I ordered a PFT.

## 2024-08-19 ENCOUNTER — OFFICE VISIT (OUTPATIENT)
Dept: FAMILY MEDICINE CLINIC | Facility: CLINIC | Age: 64
End: 2024-08-19

## 2024-08-19 ENCOUNTER — TELEPHONE (OUTPATIENT)
Age: 64
End: 2024-08-19

## 2024-08-19 VITALS
SYSTOLIC BLOOD PRESSURE: 120 MMHG | RESPIRATION RATE: 18 BRPM | OXYGEN SATURATION: 96 % | HEIGHT: 63 IN | BODY MASS INDEX: 34.55 KG/M2 | HEART RATE: 109 BPM | DIASTOLIC BLOOD PRESSURE: 70 MMHG | WEIGHT: 195 LBS | TEMPERATURE: 97.6 F

## 2024-08-19 DIAGNOSIS — R79.89 ABNORMAL TSH: ICD-10-CM

## 2024-08-19 DIAGNOSIS — J43.8 OTHER EMPHYSEMA (HCC): Primary | ICD-10-CM

## 2024-08-19 DIAGNOSIS — G89.29 CHRONIC BILATERAL LOW BACK PAIN WITHOUT SCIATICA: ICD-10-CM

## 2024-08-19 DIAGNOSIS — M54.50 CHRONIC BILATERAL LOW BACK PAIN WITHOUT SCIATICA: ICD-10-CM

## 2024-08-19 DIAGNOSIS — J04.0 LARYNGITIS: ICD-10-CM

## 2024-08-19 PROCEDURE — 99203 OFFICE O/P NEW LOW 30 MIN: CPT | Performed by: NURSE PRACTITIONER

## 2024-08-19 NOTE — ASSESSMENT & PLAN NOTE
Repeat blood work, TSH level abnormal in the emergency room.  Did have CT of neck which did not warrant additional workup at this time.

## 2024-08-19 NOTE — ASSESSMENT & PLAN NOTE
Recently seen by pulmonary, finished course of Zithromax, on inhaler twice daily with as needed use of albuterol.

## 2024-08-19 NOTE — PROGRESS NOTES
OFFICE VISIT  Awilda Saul 63 y.o. female MRN: [id number]      Depression Screening and Follow-up Plan: Patient's depression screening was positive with a PHQ-2 score of 5. Their PHQ-9 score was 21.          Assessment / Plan:  Problem List Items Addressed This Visit          Respiratory    COPD (chronic obstructive pulmonary disease) (HCC) - Primary     Recently seen by pulmonary, finished course of Zithromax, on inhaler twice daily with as needed use of albuterol.         Relevant Orders    CBC and differential       Surgery/Wound/Pain    Chronic bilateral low back pain without sciatica     MRI in 2017 abnormal.  With chronic low back pain worsening.  Will initiate x-ray along with physical therapy including aqua therapy.  Will most likely need MRI due to worsening symptoms         Relevant Orders    XR spine lumbar minimum 4 views non injury    Ambulatory Referral to Physical Therapy       Other    Abnormal TSH     Repeat blood work, TSH level abnormal in the emergency room.  Did have CT of neck which did not warrant additional workup at this time.         Relevant Orders    TSH, 3rd generation with Free T4 reflex     Other Visit Diagnoses       Laryngitis                  Reason For Visit / Chief Complaint  Chief Complaint   Patient presents with    [facility]        HPI:  Awilda Saul is a 63 y.o. female who presents today to establish [facility].  Patient was referred to the office via emergency rooms visit.  She was recently in the emergency room for complaints of sore throat, chest tightness.  She did have a workup done included a CAT scan of the abdomen and pelvis along with CAT scan of her neck.  She tells me that she has been treating herself over the last several years holistically.  She has several complaints which include loss of voice, lump in her throat, chronic back pain, kidney pain.  She reports activity change, headaches.  She reports GI issues.    Historical Information   Past Medical History:    Diagnosis Date    COPD (chronic obstructive pulmonary disease) ([facility])     Osteoporosis     Sinusitis     Sleep apnea, obstructive      Past Surgical History:   Procedure Laterality Date    HEMORROIDECTOMY      VEIN LIGATION AND STRIPPING       Social History   Social History     Substance and Sexual Activity   Alcohol Use No     Social History     Substance and Sexual Activity   Drug Use Never     Social History     Tobacco Use   Smoking Status Former    Current packs/day: 1.00    Types: Cigarettes   Smokeless Tobacco Never     Family History   Problem Relation Age of Onset    Heart disease Father     Lung cancer Father        Meds/Allergies   Allergies   Allergen Reactions    Citalopram Diarrhea    Penicillins Hives    Pregabalin GI Intolerance       Meds:    Current Outpatient Medications:     albuterol (Ventolin HFA) 90 mcg/act inhaler, Inhale 2 puffs every 6 (six) hours as needed for wheezing or shortness of breath, Disp: 18 g, Rfl: 1    Budeson-Glycopyrrol-Formoterol (Breztri Aerosphere) 160-9-4.8 MCG/ACT AERO, Inhale 2 puffs 2 (two) times a day Rinse mouth after use., Disp: 10.7 g, Rfl: 2    lidocaine (LIDODERM) 5 %, Apply 1 patch topically daily Remove & Discard patch within 12 hours or as directed by MD (Patient not taking: Reported on 8/19/2024), Disp: 30 patch, Rfl: 0      REVIEW OF SYSTEMS  Review of Systems   Constitutional:  Positive for activity change and fatigue. Negative for chills and fever.   HENT:  Positive for trouble swallowing and voice change. Negative for congestion, ear discharge, ear pain, sinus pressure, sinus pain, sore throat and tinnitus.    Eyes:  Negative for photophobia, pain, discharge, itching and visual disturbance.   Respiratory:  Negative for cough, chest tightness, shortness of breath and wheezing.    Cardiovascular:  Negative for chest pain and leg swelling.   Gastrointestinal:  Positive for abdominal pain, diarrhea and nausea. Negative for abdominal distention, constipation  "and vomiting.   Endocrine: Negative for polydipsia, polyphagia and polyuria.   Genitourinary:  Negative for dysuria and frequency.        Incontinence    Musculoskeletal:  Positive for arthralgias, back pain and neck pain. Negative for myalgias and neck stiffness.   Skin:  Negative for color change.   Neurological:  Positive for headaches. Negative for dizziness, syncope, weakness and numbness.   Hematological:  Does not bruise/bleed easily.   Psychiatric/Behavioral:  Negative for behavioral problems, confusion, self-injury, sleep disturbance and suicidal ideas. The patient is not nervous/anxious.            Current Vitals:   Blood Pressure: 120/70 (08/19/24 1220)  Pulse: (!) 109 (08/19/24 1220)  Temperature: 97.6 °F (36.4 °C) (08/19/24 1220)  Temp Source: Tympanic (08/19/24 1220)  Respirations: 18 (08/19/24 1220)  Height: 5' 3\" (160 cm) (08/19/24 1220)  Weight - Scale: 88.5 kg (195 lb) (08/19/24 1220)  SpO2: 96 % (08/19/24 1220)  [unfilled]    PHYSICAL EXAMS:  Physical Exam  Vitals and nursing note reviewed.   Constitutional:       Appearance: She is obese.   HENT:      Head: Normocephalic and atraumatic.   Cardiovascular:      Rate and Rhythm: Normal rate and regular rhythm.      Pulses: Normal pulses.      Heart sounds: Normal heart sounds.   Pulmonary:      Effort: Pulmonary effort is normal.      Breath sounds: Wheezing present.   Musculoskeletal:      Cervical back: Normal range of motion and neck supple.   Skin:     General: Skin is warm.   Neurological:      General: No focal deficit present.      Mental Status: She is alert and oriented to person, place, and time.   Psychiatric:         Mood and Affect: Mood normal.         Behavior: Behavior normal.         Thought Content: Thought content normal.             Lab, imaging and other studies: I have personally reviewed pertinent reports.  .             "

## 2024-08-19 NOTE — TELEPHONE ENCOUNTER
Patient stated that she was seen the office today provider advise not to have labs done until a month now that she  got home  she notice on her orders that it's saying she need have the labs done now. I advise her that the orders are good for a year she still wanted to check with provider when she wants them done. Patient also has rash coming up started a week ago more so now that she got home they are coming up. She forgot to mention in office.

## 2024-08-19 NOTE — ASSESSMENT & PLAN NOTE
MRI in 2017 abnormal.  With chronic low back pain worsening.  Will initiate x-ray along with physical therapy including aqua therapy.  Will most likely need MRI due to worsening symptoms

## 2024-08-27 ENCOUNTER — TELEPHONE (OUTPATIENT)
Age: 64
End: 2024-08-27

## 2024-08-29 ENCOUNTER — HOSPITAL ENCOUNTER (OUTPATIENT)
Dept: PULMONOLOGY | Facility: HOSPITAL | Age: 64
End: 2024-08-29
Attending: INTERNAL MEDICINE

## 2024-08-29 DIAGNOSIS — J43.9 PULMONARY EMPHYSEMA, UNSPECIFIED EMPHYSEMA TYPE (HCC): ICD-10-CM

## 2024-08-29 DIAGNOSIS — J84.10 PULMONARY FIBROSIS (HCC): ICD-10-CM

## 2024-08-29 PROCEDURE — 94729 DIFFUSING CAPACITY: CPT | Performed by: INTERNAL MEDICINE

## 2024-08-29 PROCEDURE — 94060 EVALUATION OF WHEEZING: CPT | Performed by: INTERNAL MEDICINE

## 2024-08-29 PROCEDURE — 94618 PULMONARY STRESS TESTING: CPT

## 2024-08-29 PROCEDURE — 94726 PLETHYSMOGRAPHY LUNG VOLUMES: CPT | Performed by: INTERNAL MEDICINE

## 2024-08-29 PROCEDURE — 94726 PLETHYSMOGRAPHY LUNG VOLUMES: CPT

## 2024-08-29 PROCEDURE — 94618 PULMONARY STRESS TESTING: CPT | Performed by: INTERNAL MEDICINE

## 2024-08-29 PROCEDURE — 94729 DIFFUSING CAPACITY: CPT

## 2024-08-29 PROCEDURE — 94060 EVALUATION OF WHEEZING: CPT

## 2024-08-29 RX ORDER — ALBUTEROL SULFATE 0.83 MG/ML
2.5 SOLUTION RESPIRATORY (INHALATION) ONCE
Status: COMPLETED | OUTPATIENT
Start: 2024-08-29 | End: 2024-08-29

## 2024-08-29 RX ADMIN — ALBUTEROL SULFATE 2.5 MG: 2.5 SOLUTION RESPIRATORY (INHALATION) at 11:28

## 2024-09-03 ENCOUNTER — APPOINTMENT (OUTPATIENT)
Dept: LAB | Facility: CLINIC | Age: 64
End: 2024-09-03

## 2024-09-03 DIAGNOSIS — J84.10 PULMONARY FIBROSIS (HCC): ICD-10-CM

## 2024-09-03 PROCEDURE — 86235 NUCLEAR ANTIGEN ANTIBODY: CPT | Performed by: INTERNAL MEDICINE

## 2024-09-03 PROCEDURE — 83516 IMMUNOASSAY NONANTIBODY: CPT

## 2024-09-04 DIAGNOSIS — J43.9 PULMONARY EMPHYSEMA, UNSPECIFIED EMPHYSEMA TYPE (HCC): ICD-10-CM

## 2024-09-04 RX ORDER — ALBUTEROL SULFATE 90 UG/1
2 AEROSOL, METERED RESPIRATORY (INHALATION) EVERY 6 HOURS PRN
Qty: 18 G | Refills: 5 | Status: SHIPPED | OUTPATIENT
Start: 2024-09-04

## 2024-09-09 ENCOUNTER — OFFICE VISIT (OUTPATIENT)
Dept: OBGYN CLINIC | Facility: CLINIC | Age: 64
End: 2024-09-09
Payer: OTHER GOVERNMENT

## 2024-09-09 VITALS — SYSTOLIC BLOOD PRESSURE: 118 MMHG | WEIGHT: 198 LBS | BODY MASS INDEX: 35.07 KG/M2 | DIASTOLIC BLOOD PRESSURE: 78 MMHG

## 2024-09-09 DIAGNOSIS — Z12.31 ENCOUNTER FOR SCREENING MAMMOGRAM FOR BREAST CANCER: ICD-10-CM

## 2024-09-09 DIAGNOSIS — Z01.419 WOMEN'S ANNUAL ROUTINE GYNECOLOGICAL EXAMINATION: ICD-10-CM

## 2024-09-09 DIAGNOSIS — R10.30 LOWER ABDOMINAL PAIN: Primary | ICD-10-CM

## 2024-09-09 PROCEDURE — 99386 PREV VISIT NEW AGE 40-64: CPT | Performed by: OBSTETRICS & GYNECOLOGY

## 2024-09-09 PROCEDURE — G0145 SCR C/V CYTO,THINLAYER,RESCR: HCPCS | Performed by: OBSTETRICS & GYNECOLOGY

## 2024-09-09 PROCEDURE — G0476 HPV COMBO ASSAY CA SCREEN: HCPCS | Performed by: OBSTETRICS & GYNECOLOGY

## 2024-09-09 NOTE — PROGRESS NOTES
Ambulatory Visit  Name: Awilda Saul      : 1960      MRN: [id number]  Encounter Provider: Bassem Noe MD  Encounter Date: 2024   Encounter department: OB GYN [facility]    Assessment & Plan   1. Encounter for screening mammogram for breast cancer  -     Mammo screening bilateral w 3d and cad; Future  2. Women's annual routine gynecological examination    Patient was seen today because of vague abdominal discomfort.  Her GYN examination was completely benign there was no cervical motion tenderness.  The bladder is well situated.  I was unable to reproduce her abdominal pain which is generalized comes and goes.  Her CT scan was not helpful.  We we will order an ultrasound of the pelvis.  I did inform the patient I think these findings will be negative.  I do not think this is a GYN etiology.  We may need to consider a general surgery consult for vague abdominal discomfort.  History of Present Illness     Awilda Saul is a 63 y.o. female who presents for a GYN examination.  Her last pelvic exam was over a 6 years ago.  She is a  4 para 4 with 4 prior vaginal deliveries, she has 3 living children.  She is menopausal.  She is no longer sexually active.  She is not happy with her weight.  She feels safe at home.  She does not see a dentist on a regular basis reminded to return to better with that.  She is a former smoker she is stopped a while ago.  Has a history of hypothyroidism.  Also complaining of vague lower abdominal pain and stress urinary continence.  Had a recent CT scan of the abdomen and pelvis all which were benign.  She had a colonoscopy a while ago shows multiple polyps she was never told for follow-up I reminded to make a referral follow-up with that team.  She will need a Pap smear today.  Her abdominal symptoms are consistent with some pelvic floor relaxation with stress urinary incontinence.  Constant lower abdominal discomfort.    Review of Systems   Genitourinary:          Vague generalized abdominal discomfort   All other systems reviewed and are negative.      Objective     /78   Wt 89.8 kg (198 lb)   BMI 35.07 kg/m²     Physical Exam  Vitals reviewed.   Constitutional:       Appearance: Normal appearance.   HENT:      Head: Normocephalic and atraumatic.      Nose: Nose normal.      Mouth/Throat:      Mouth: Mucous membranes are moist.   Eyes:      Extraocular Movements: Extraocular movements intact.      Pupils: Pupils are equal, round, and reactive to light.   Cardiovascular:      Rate and Rhythm: Normal rate and regular rhythm.   Pulmonary:      Breath sounds: Normal breath sounds.   Chest:   Breasts:     Breasts are symmetrical.      Right: Normal.      Left: Normal.   Abdominal:      General: Abdomen is flat. Bowel sounds are normal. There is no distension.      Palpations: Abdomen is soft. There is no hepatomegaly, splenomegaly or mass.      Tenderness: There is no abdominal tenderness. There is no right CVA tenderness, left CVA tenderness, guarding or rebound.      Hernia: No hernia is present. There is no hernia in the left inguinal area or right inguinal area.   Genitourinary:     General: Normal vulva.      Pubic Area: No rash or pubic lice.       Labia:         Right: No rash, tenderness, lesion or injury.         Left: No rash, tenderness, lesion or injury.       Urethra: No prolapse, urethral pain, urethral swelling or urethral lesion.      Rectum: Normal.      Comments: The external genitalia within the normal limits, the vagina is clean.  There is no cervical motion tenderness.  There is no evidence of prolapse.  The adnexa is clear bilaterally.  The urethra and bladder normal working relationship.  I could not reproduce any of the pelvic pain or discomfort she was describing.  A Pap smear was performed.  Musculoskeletal:         General: Normal range of motion.      Cervical back: Normal range of motion and neck supple.   Lymphadenopathy:      Upper Body:       Right upper body: No supraclavicular or axillary adenopathy.      Left upper body: No supraclavicular or axillary adenopathy.      Lower Body: No right inguinal adenopathy. No left inguinal adenopathy.   Skin:     General: Skin is warm and dry.   Neurological:      General: No focal deficit present.      Mental Status: She is alert and oriented to person, place, and time.   Psychiatric:         Mood and Affect: Mood normal.         Behavior: Behavior normal.       Administrative Statements

## 2024-09-09 NOTE — PATIENT INSTRUCTIONS
The patient was informed of negative findings on general but abdominal and GYN and pelvic examination.  I was unable to reproduce the pain she is complaining about.  She says sometimes she feels shooting pains in her lower abdomen into the vagina.  Speculum exam is completely benign.  We will order a pelvic and transvaginal ultrasound.  I informed her that I do believe these findings will be negative.  She may need to see general surgery for evaluation.

## 2024-09-10 ENCOUNTER — TELEPHONE (OUTPATIENT)
Facility: HOSPITAL | Age: 64
End: 2024-09-10

## 2024-09-10 ENCOUNTER — TELEPHONE (OUTPATIENT)
Age: 64
End: 2024-09-10

## 2024-09-10 LAB
HPV HR 12 DNA CVX QL NAA+PROBE: NEGATIVE
HPV16 DNA CVX QL NAA+PROBE: NEGATIVE
HPV18 DNA CVX QL NAA+PROBE: NEGATIVE

## 2024-09-10 NOTE — TELEPHONE ENCOUNTER
Pt calling to see if her results from ILD came back. Informed her they are still in process but will have office be on the look out for the results.     Pt would like CB when results are in

## 2024-09-10 NOTE — TELEPHONE ENCOUNTER
Telephone Call    [x] Called Patient regarding [facility]; Patient answered call and wants to enroll; Asked for a call back.    [] Called Patient regarding [facility]; Patient answered call; Declined to enroll in program.    [] Called Patient regarding [facility]; Patient did not ; Left voicemail with my name and direct phone number.     [] Called Patient regarding [facility]; Patient did not ; Unable to leave voicemail.    [] Called Patient regarding [facility]; Phone number is invalid    Attempts (Max 3): []1   [x]2   []3        Additional Notes:

## 2024-09-11 ENCOUNTER — PATIENT OUTREACH (OUTPATIENT)
Facility: HOSPITAL | Age: 64
End: 2024-09-11

## 2024-09-12 LAB
LAB AP GYN PRIMARY INTERPRETATION: NORMAL
Lab: NORMAL

## 2024-09-13 NOTE — TELEPHONE ENCOUNTER
Please let patient know that it can take up to 2 weeks as we do have to send them out please let her know that once they are resulted we will call her with the results

## 2024-09-13 NOTE — TELEPHONE ENCOUNTER
Spoke with pt made her aware of the results. She would like to know when she will get the results of her ILD lab that she had done on 9/3, it's now been 10 days and she would like the results.

## 2024-09-13 NOTE — TELEPHONE ENCOUNTER
LM for pt to CB to go over results.    If pt calls back per Dr. Walker:   her pulmonary function test result showed only mild reduction in diffusion capacity.  This could be due to emphysema or mild lung fibrosis.  We will discuss this further at the next visit 10/22

## 2024-09-18 ENCOUNTER — LAB (OUTPATIENT)
Dept: LAB | Facility: CLINIC | Age: 64
End: 2024-09-18
Payer: COMMERCIAL

## 2024-09-18 DIAGNOSIS — R79.89 ABNORMAL TSH: ICD-10-CM

## 2024-09-18 DIAGNOSIS — J43.8 OTHER EMPHYSEMA (HCC): ICD-10-CM

## 2024-09-18 LAB
BASOPHILS # BLD AUTO: 0.05 THOUSANDS/ΜL (ref 0–0.1)
BASOPHILS NFR BLD AUTO: 1 % (ref 0–1)
EOSINOPHIL # BLD AUTO: 0.31 THOUSAND/ΜL (ref 0–0.61)
EOSINOPHIL NFR BLD AUTO: 3 % (ref 0–6)
ERYTHROCYTE [DISTWIDTH] IN BLOOD BY AUTOMATED COUNT: 13.5 % (ref 11.6–15.1)
HCT VFR BLD AUTO: 43.9 % (ref 34.8–46.1)
HGB BLD-MCNC: 14.5 G/DL (ref 11.5–15.4)
IMM GRANULOCYTES # BLD AUTO: 0.04 THOUSAND/UL (ref 0–0.2)
IMM GRANULOCYTES NFR BLD AUTO: 0 % (ref 0–2)
LYMPHOCYTES # BLD AUTO: 3.57 THOUSANDS/ΜL (ref 0.6–4.47)
LYMPHOCYTES NFR BLD AUTO: 32 % (ref 14–44)
MCH RBC QN AUTO: 30 PG (ref 26.8–34.3)
MCHC RBC AUTO-ENTMCNC: 33 G/DL (ref 31.4–37.4)
MCV RBC AUTO: 91 FL (ref 82–98)
MONOCYTES # BLD AUTO: 0.98 THOUSAND/ΜL (ref 0.17–1.22)
MONOCYTES NFR BLD AUTO: 9 % (ref 4–12)
NEUTROPHILS # BLD AUTO: 6.1 THOUSANDS/ΜL (ref 1.85–7.62)
NEUTS SEG NFR BLD AUTO: 55 % (ref 43–75)
NRBC BLD AUTO-RTO: 0 /100 WBCS
PLATELET # BLD AUTO: 348 THOUSANDS/UL (ref 149–390)
PMV BLD AUTO: 10.9 FL (ref 8.9–12.7)
RBC # BLD AUTO: 4.84 MILLION/UL (ref 3.81–5.12)
TSH SERPL DL<=0.05 MIU/L-ACNC: 0.53 UIU/ML (ref 0.45–4.5)
WBC # BLD AUTO: 11.05 THOUSAND/UL (ref 4.31–10.16)

## 2024-09-18 PROCEDURE — 84443 ASSAY THYROID STIM HORMONE: CPT

## 2024-09-18 PROCEDURE — 36415 COLL VENOUS BLD VENIPUNCTURE: CPT

## 2024-09-18 PROCEDURE — 85025 COMPLETE CBC W/AUTO DIFF WBC: CPT

## 2024-09-20 LAB — MISCELLANEOUS LAB TEST RESULT: NORMAL

## 2024-09-23 ENCOUNTER — HOSPITAL ENCOUNTER (OUTPATIENT)
Dept: ULTRASOUND IMAGING | Facility: HOSPITAL | Age: 64
Discharge: HOME | End: 2024-09-23

## 2024-09-23 DIAGNOSIS — R10.30 LOWER ABDOMINAL PAIN: ICD-10-CM

## 2024-09-23 PROCEDURE — 76856 US EXAM PELVIC COMPLETE: CPT

## 2024-09-23 PROCEDURE — 76830 TRANSVAGINAL US NON-OB: CPT

## 2024-09-25 ENCOUNTER — TELEPHONE (OUTPATIENT)
Age: 64
End: 2024-09-25

## 2024-09-25 NOTE — TELEPHONE ENCOUNTER
Dr. Walker    09/03/24 ILD Lab Results    Please call patient with results. Pt upset no one has called her.    ____________________________    Dmitri    Can you forward the 09/03/24 ILD Lab Results in a [employer] message to the patient? I do not have access to do that.

## 2024-09-27 NOTE — TELEPHONE ENCOUNTER
I called the patient again today and left a detailed message regarding ILD panel was negative as well as CT scan and PFT results.  We will discuss this in more detail at the next visit.

## 2024-11-05 ENCOUNTER — HOSPITAL ENCOUNTER (OUTPATIENT)
Dept: MAMMOGRAPHY | Facility: HOSPITAL | Age: 64
Discharge: HOME | End: 2024-11-05
Payer: OTHER GOVERNMENT

## 2024-11-05 ENCOUNTER — HOSPITAL ENCOUNTER (OUTPATIENT)
Dept: ULTRASOUND IMAGING | Facility: HOSPITAL | Age: 64
Discharge: HOME | End: 2024-11-05
Payer: OTHER GOVERNMENT

## 2024-11-05 DIAGNOSIS — N64.4 BREAST PAIN: ICD-10-CM

## 2024-11-05 PROCEDURE — 77066 DX MAMMO INCL CAD BI: CPT

## 2024-11-05 PROCEDURE — G0279 TOMOSYNTHESIS, MAMMO: HCPCS

## 2024-11-05 PROCEDURE — 76642 ULTRASOUND BREAST LIMITED: CPT

## 2024-11-27 ENCOUNTER — TELEPHONE (OUTPATIENT)
Age: 64
End: 2024-11-27

## 2024-11-27 NOTE — TELEPHONE ENCOUNTER
Pt states someone called her at [phone], did not leave a message.     Messaged on Teams, pt on phone, no response that anyone called her.    Pt has appt 11/29 and confirmed appt.

## 2024-11-29 ENCOUNTER — OFFICE VISIT (OUTPATIENT)
Dept: PULMONOLOGY | Facility: CLINIC | Age: 64
End: 2024-11-29

## 2024-11-29 VITALS
DIASTOLIC BLOOD PRESSURE: 80 MMHG | SYSTOLIC BLOOD PRESSURE: 116 MMHG | OXYGEN SATURATION: 97 % | WEIGHT: 198 LBS | TEMPERATURE: 97.8 F | HEART RATE: 96 BPM | BODY MASS INDEX: 35.08 KG/M2 | HEIGHT: 63 IN

## 2024-11-29 DIAGNOSIS — E66.09 CLASS 2 OBESITY DUE TO EXCESS CALORIES WITHOUT SERIOUS COMORBIDITY WITH BODY MASS INDEX (BMI) OF 35.0 TO 35.9 IN ADULT: ICD-10-CM

## 2024-11-29 DIAGNOSIS — J84.10 PULMONARY FIBROSIS (HCC): ICD-10-CM

## 2024-11-29 DIAGNOSIS — E66.812 CLASS 2 OBESITY DUE TO EXCESS CALORIES WITHOUT SERIOUS COMORBIDITY WITH BODY MASS INDEX (BMI) OF 35.0 TO 35.9 IN ADULT: ICD-10-CM

## 2024-11-29 DIAGNOSIS — F17.200 TOBACCO USE DISORDER: ICD-10-CM

## 2024-11-29 DIAGNOSIS — J43.9 PULMONARY EMPHYSEMA, UNSPECIFIED EMPHYSEMA TYPE (HCC): Primary | ICD-10-CM

## 2024-11-29 PROCEDURE — 99214 OFFICE O/P EST MOD 30 MIN: CPT | Performed by: INTERNAL MEDICINE

## 2024-11-29 RX ORDER — BUDESONIDE, GLYCOPYRROLATE, AND FORMOTEROL FUMARATE 160; 9; 4.8 UG/1; UG/1; UG/1
2 AEROSOL, METERED RESPIRATORY (INHALATION) 2 TIMES DAILY
Qty: 10.7 G | Refills: 0 | Status: SHIPPED | COMMUNITY
Start: 2024-11-29

## 2024-11-29 NOTE — PROGRESS NOTES
Name: Awilda Saul      : 1960      MRN: [id number]  Encounter Provider: Florecita Walker MD  Encounter Date: 2024   Encounter department: [facility]  :  Assessment & Plan  Pulmonary emphysema, unspecified emphysema type (HCC)  Ms.Sheila Saul has emphysema from her smoking.  Currently her exercise tolerance was about 2 blocks.  She had cough with yellow phlegm.  She had wheezing.  On clinical examination, her chest was clear to auscultation.  The review of her CT scan showed evidence of structural emphysema and fibrosis.  PFT showed mild diffusion defect.  Normal spirometry.  She is currently not using Breztri and uses albuterol only very rarely.   I have since advised her to start on Breztr I have given her a sample.  I had a long discussion with her and answered all her questions.   Orders:    Budeson-Glycopyrrol-Formoterol (Breztri Aerosphere) 160-9-4.8 MCG/ACT AERO; Inhale 2 puffs 2 (two) times a day Rinse mouth after use.    Pulmonary fibrosis (HCC)  Her CT scan of the chest from 2024 showed reticular and fibrotic changes bilaterally along with some honeycombing at the right lung base.  Etiology is not clear at this point.  She has history of dry mouth, joint pains and back pain.  She needs a connective tissue workup.  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  COPD emphysema. Her ILD panel was negative.  PFT showed mild diffusion defect.  He mentioned features suggestive of Raynaud's phenomenon swallowing problems joint pains and rashes.  I have recommended a rheumatology consultation.       Tobacco use disorder  She has been a smoker since age 16 and was smoking about a pack a day.  She states that she quit few months back.  I counseled her to remain quit.       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  Her previous CT scan had shown structural emphysema and she was a smoker.  Her PFT however showed normal spirometry mild diffusion defect.  She also was found to have reticular changes at both lung bases.  Her ILD panel came negative.  She has history of allergies.  She was previously on Breztri regularly and albuterol as needed.  Currently she is not using Breztri and uses albuterol very rarely.  She has quit smoking few months back and seems very motivated to remain quit.  She is obese and understands the need for weight reduction.  Her exercise tolerance is about 2 blocks and she denied any significant phlegm or wheeze.  She denied any chest pain or palpitations.  However she has been having history suggestive of Raynaud's phenomenon skin rashes and exfoliation of skin in the palms.  She has myalgia and has occasional swallowing problems.  She has bilateral knee pain.  She also has back pain.  She has not been seen by a rheumatologist and have advised her to do so.  She denied any fever or chills.  No chest pain.  No swelling of feet.      Review of Systems   Constitutional:  Positive for fatigue. Negative for appetite change, chills and fever.   HENT:  Positive for rhinorrhea and trouble swallowing. Negative for hearing loss, sneezing and sore throat.    Respiratory:  Positive for cough, shortness of breath and wheezing. Negative for chest tightness.    Cardiovascular:  Negative for chest pain, palpitations and leg swelling.   Gastrointestinal:  Positive for nausea. Negative for abdominal pain, constipation, diarrhea and vomiting.   Genitourinary:  Positive for urgency. Negative for dysuria and frequency.   Musculoskeletal:  Positive for arthralgias, joint swelling and myalgias.   Skin:  Positive for rash (palms;  "peeling).   Allergic/Immunologic: Positive for environmental allergies.   Neurological:  Positive for light-headedness and headaches (migraine). Negative for dizziness, seizures and syncope.   Psychiatric/Behavioral:  Negative for agitation, confusion and sleep disturbance. The patient is not nervous/anxious.           Objective   /80 (BP Location: Left arm, Patient Position: Sitting, Cuff Size: Standard)   Pulse 96   Temp 97.8 °F (36.6 °C) (Tympanic)   Ht 5' 3\" (1.6 m)   Wt 89.8 kg (198 lb)   SpO2 97%   BMI 35.07 kg/m²      Physical Exam  Vitals reviewed.   Constitutional:       General: She is not in acute distress.     Appearance: She is obese. She is not ill-appearing, toxic-appearing or diaphoretic.   HENT:      Head: Normocephalic.      Mouth/Throat:      Mouth: Mucous membranes are moist.   Eyes:      General: No scleral icterus.     Conjunctiva/sclera: Conjunctivae normal.   Cardiovascular:      Rate and Rhythm: Normal rate and regular rhythm.      Heart sounds: Normal heart sounds. No murmur heard.  Pulmonary:      Effort: Pulmonary effort is normal. No respiratory distress.      Breath sounds: No stridor. Rhonchi present. No wheezing or rales.   Chest:      Chest wall: No tenderness.   Abdominal:      General: Bowel sounds are normal.      Palpations: Abdomen is soft.      Tenderness: There is no abdominal tenderness. There is no guarding.   Musculoskeletal:      Cervical back: Neck supple. No rigidity.      Right lower leg: No edema.      Left lower leg: No edema.   Lymphadenopathy:      Cervical: No cervical adenopathy.   Skin:     Coloration: Skin is not jaundiced or pale.      Findings: No rash.   Neurological:      Mental Status: She is alert and oriented to person, place, and time.      Gait: Gait normal.   Psychiatric:         Mood and Affect: Mood normal.         Behavior: Behavior normal.         Thought Content: Thought content normal.         Judgment: Judgment normal.           "

## 2024-11-29 NOTE — ASSESSMENT & PLAN NOTE
She has been a smoker since age 16 and was smoking about a pack a day.  She states that she quit few months back.  I counseled her to remain quit.

## 2024-11-29 NOTE — ASSESSMENT & PLAN NOTE
Her CT scan of the chest from 8/11/2024 showed reticular and fibrotic changes bilaterally along with some honeycombing at the right lung base.  Etiology is not clear at this point.  She has history of dry mouth, joint pains and back pain.  She needs a connective tissue workup.  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  COPD emphysema. Her ILD panel was negative.  PFT showed mild diffusion defect.  He mentioned features suggestive of Raynaud's phenomenon swallowing problems joint pains and rashes.  I have recommended a rheumatology consultation.

## 2024-11-29 NOTE — ASSESSMENT & PLAN NOTE
Ms.Sheila Saul has emphysema from her smoking.  Currently her exercise tolerance was about 2 blocks.  She had cough with yellow phlegm.  She had wheezing.  On clinical examination, her chest was clear to auscultation.  The review of her CT scan showed evidence of structural emphysema and fibrosis.  PFT showed mild diffusion defect.  Normal spirometry.  She is currently not using Breztri and uses albuterol only very rarely.   I have since advised her to start on Breztr I have given her a sample.  I had a long discussion with her and answered all her questions.   Orders:    Budeson-Glycopyrrol-Formoterol (Breztri Aerosphere) 160-9-4.8 MCG/ACT AERO; Inhale 2 puffs 2 (two) times a day Rinse mouth after use.

## 2024-11-29 NOTE — ASSESSMENT & PLAN NOTE
She is obese and understands need for weight reduction        High Dose Vitamin A Pregnancy And Lactation Text: High dose vitamin A therapy is contraindicated during pregnancy and breast feeding. Topical Clindamycin Pregnancy And Lactation Text: This medication is Pregnancy Category B and is considered safe during pregnancy. It is unknown if it is excreted in breast milk. Spironolactone Counseling: Patient advised regarding risks of diarrhea, abdominal pain, hyperkalemia, birth defects (for female patients), liver toxicity and renal toxicity. The patient may need blood work to monitor liver and kidney function and potassium levels while on therapy. The patient verbalized understanding of the proper use and possible adverse effects of spironolactone.  All of the patient's questions and concerns were addressed. Isotretinoin Counseling: Patient should get monthly blood tests, not donate blood, not drive at night if vision affected, not share medication, and not undergo elective surgery for 6 months after tx completed. Side effects reviewed, pt to contact office should one occur. Doxycycline Pregnancy And Lactation Text: This medication is Pregnancy Category D and not consider safe during pregnancy. It is also excreted in breast milk but is considered safe for shorter treatment courses. High Dose Vitamin A Counseling: Side effects reviewed, pt to contact office should one occur. Birth Control Pills Counseling: Birth Control Pill Counseling: I discussed with the patient the potential side effects of OCPs including but not limited to increased risk of stroke, heart attack, thrombophlebitis, deep venous thrombosis, hepatic adenomas, breast changes, GI upset, headaches, and depression.  The patient verbalized understanding of the proper use and possible adverse effects of OCPs. All of the patient's questions and concerns were addressed. Benzoyl Peroxide Counseling: Patient counseled that medicine may cause skin irritation and bleach clothing.  In the event of skin irritation, the patient was advised to reduce the amount of the drug applied or use it less frequently.   The patient verbalized understanding of the proper use and possible adverse effects of benzoyl peroxide.  All of the patient's questions and concerns were addressed. Doxycycline Counseling:  Patient counseled regarding possible photosensitivity and increased risk for sunburn.  Patient instructed to avoid sunlight, if possible.  When exposed to sunlight, patients should wear protective clothing, sunglasses, and sunscreen.  The patient was instructed to call the office immediately if the following severe adverse effects occur:  hearing changes, easy bruising/bleeding, severe headache, or vision changes.  The patient verbalized understanding of the proper use and possible adverse effects of doxycycline.  All of the patient's questions and concerns were addressed. Detail Level: Detailed Birth Control Pills Pregnancy And Lactation Text: This medication should be avoided if pregnant and for the first 30 days post-partum. Bactrim Pregnancy And Lactation Text: This medication is Pregnancy Category D and is known to cause fetal risk.  It is also excreted in breast milk. Spironolactone Pregnancy And Lactation Text: This medication can cause feminization of the male fetus and should be avoided during pregnancy. The active metabolite is also found in breast milk. Include Pregnancy/Lactation Warning?: No Dapsone Counseling: I discussed with the patient the risks of dapsone including but not limited to hemolytic anemia, agranulocytosis, rashes, methemoglobinemia, kidney failure, peripheral neuropathy, headaches, GI upset, and liver toxicity.  Patients who start dapsone require monitoring including baseline LFTs and weekly CBCs for the first month, then every month thereafter.  The patient verbalized understanding of the proper use and possible adverse effects of dapsone.  All of the patient's questions and concerns were addressed. Topical Sulfur Applications Counseling: Topical Sulfur Counseling: Patient counseled that this medication may cause skin irritation or allergic reactions.  In the event of skin irritation, the patient was advised to reduce the amount of the drug applied or use it less frequently.   The patient verbalized understanding of the proper use and possible adverse effects of topical sulfur application.  All of the patient's questions and concerns were addressed. Minocycline Pregnancy And Lactation Text: This medication is Pregnancy Category D and not consider safe during pregnancy. It is also excreted in breast milk. Tetracycline Counseling: Patient counseled regarding possible photosensitivity and increased risk for sunburn.  Patient instructed to avoid sunlight, if possible.  When exposed to sunlight, patients should wear protective clothing, sunglasses, and sunscreen.  The patient was instructed to call the office immediately if the following severe adverse effects occur:  hearing changes, easy bruising/bleeding, severe headache, or vision changes.  The patient verbalized understanding of the proper use and possible adverse effects of tetracycline.  All of the patient's questions and concerns were addressed. Patient understands to avoid pregnancy while on therapy due to potential birth defects. Azelaic Acid Pregnancy And Lactation Text: This medication is considered safe during pregnancy and breast feeding. Erythromycin Pregnancy And Lactation Text: This medication is Pregnancy Category B and is considered safe during pregnancy. It is also excreted in breast milk. Erythromycin Counseling:  I discussed with the patient the risks of erythromycin including but not limited to GI upset, allergic reaction, drug rash, diarrhea, increase in liver enzymes, and yeast infections. Topical Retinoid Pregnancy And Lactation Text: This medication is Pregnancy Category C. It is unknown if this medication is excreted in breast milk. Minocycline Counseling: Patient advised regarding possible photosensitivity and discoloration of the teeth, skin, lips, tongue and gums.  Patient instructed to avoid sunlight, if possible.  When exposed to sunlight, patients should wear protective clothing, sunglasses, and sunscreen.  The patient was instructed to call the office immediately if the following severe adverse effects occur:  hearing changes, easy bruising/bleeding, severe headache, or vision changes.  The patient verbalized understanding of the proper use and possible adverse effects of minocycline.  All of the patient's questions and concerns were addressed. Benzoyl Peroxide Pregnancy And Lactation Text: This medication is Pregnancy Category C. It is unknown if benzoyl peroxide is excreted in breast milk. Topical Clindamycin Counseling: Patient counseled that this medication may cause skin irritation or allergic reactions.  In the event of skin irritation, the patient was advised to reduce the amount of the drug applied or use it less frequently.   The patient verbalized understanding of the proper use and possible adverse effects of clindamycin.  All of the patient's questions and concerns were addressed. Isotretinoin Pregnancy And Lactation Text: This medication is Pregnancy Category X and is considered extremely dangerous during pregnancy. It is unknown if it is excreted in breast milk. Azithromycin Pregnancy And Lactation Text: This medication is considered safe during pregnancy and is also secreted in breast milk. Azithromycin Counseling:  I discussed with the patient the risks of azithromycin including but not limited to GI upset, allergic reaction, drug rash, diarrhea, and yeast infections. Tazorac Pregnancy And Lactation Text: This medication is not safe during pregnancy. It is unknown if this medication is excreted in breast milk. Bactrim Counseling:  I discussed with the patient the risks of sulfa antibiotics including but not limited to GI upset, allergic reaction, drug rash, diarrhea, dizziness, photosensitivity, and yeast infections.  Rarely, more serious reactions can occur including but not limited to aplastic anemia, agranulocytosis, methemoglobinemia, blood dyscrasias, liver or kidney failure, lung infiltrates or desquamative/blistering drug rashes. Tazorac Counseling:  Patient advised that medication is irritating and drying.  Patient may need to apply sparingly and wash off after an hour before eventually leaving it on overnight.  The patient verbalized understanding of the proper use and possible adverse effects of tazorac.  All of the patient's questions and concerns were addressed. Sarecycline Counseling: Patient advised regarding possible photosensitivity and discoloration of the teeth, skin, lips, tongue and gums.  Patient instructed to avoid sunlight, if possible.  When exposed to sunlight, patients should wear protective clothing, sunglasses, and sunscreen.  The patient was instructed to call the office immediately if the following severe adverse effects occur:  hearing changes, easy bruising/bleeding, severe headache, or vision changes.  The patient verbalized understanding of the proper use and possible adverse effects of sarecycline.  All of the patient's questions and concerns were addressed. Winlevi Pregnancy And Lactation Text: This medication is considered safe during pregnancy and breastfeeding. Dapsone Pregnancy And Lactation Text: This medication is Pregnancy Category C and is not considered safe during pregnancy or breast feeding. Topical Sulfur Applications Pregnancy And Lactation Text: This medication is Pregnancy Category C and has an unknown safety profile during pregnancy. It is unknown if this topical medication is excreted in breast milk. Azelaic Acid Counseling: Patient counseled that medicine may cause skin irritation and to avoid applying near the eyes.  In the event of skin irritation, the patient was advised to reduce the amount of the drug applied or use it less frequently.   The patient verbalized understanding of the proper use and possible adverse effects of azelaic acid.  All of the patient's questions and concerns were addressed. Winlevi Counseling:  I discussed with the patient the risks of topical clascoterone including but not limited to erythema, scaling, itching, and stinging. Patient voiced their understanding. Topical Retinoid counseling:  Patient advised to apply a pea-sized amount only at bedtime and wait 30 minutes after washing their face before applying.  If too drying, patient may add a non-comedogenic moisturizer. The patient verbalized understanding of the proper use and possible adverse effects of retinoids.  All of the patient's questions and concerns were addressed.

## 2025-01-15 ENCOUNTER — OFFICE VISIT (OUTPATIENT)
Dept: PULMONOLOGY | Facility: CLINIC | Age: 65
End: 2025-01-15
Payer: COMMERCIAL

## 2025-01-15 VITALS
HEART RATE: 102 BPM | WEIGHT: 196 LBS | SYSTOLIC BLOOD PRESSURE: 118 MMHG | HEIGHT: 63 IN | TEMPERATURE: 96.7 F | DIASTOLIC BLOOD PRESSURE: 80 MMHG | BODY MASS INDEX: 34.73 KG/M2 | OXYGEN SATURATION: 98 %

## 2025-01-15 DIAGNOSIS — J40 TRACHEOBRONCHITIS: ICD-10-CM

## 2025-01-15 DIAGNOSIS — J84.10 PULMONARY FIBROSIS (HCC): ICD-10-CM

## 2025-01-15 DIAGNOSIS — J43.9 PULMONARY EMPHYSEMA, UNSPECIFIED EMPHYSEMA TYPE (HCC): Primary | ICD-10-CM

## 2025-01-15 DIAGNOSIS — E66.812 CLASS 2 OBESITY DUE TO EXCESS CALORIES WITHOUT SERIOUS COMORBIDITY WITH BODY MASS INDEX (BMI) OF 35.0 TO 35.9 IN ADULT: ICD-10-CM

## 2025-01-15 DIAGNOSIS — E66.09 CLASS 2 OBESITY DUE TO EXCESS CALORIES WITHOUT SERIOUS COMORBIDITY WITH BODY MASS INDEX (BMI) OF 35.0 TO 35.9 IN ADULT: ICD-10-CM

## 2025-01-15 DIAGNOSIS — F17.200 TOBACCO USE DISORDER: ICD-10-CM

## 2025-01-15 PROCEDURE — 99214 OFFICE O/P EST MOD 30 MIN: CPT | Performed by: INTERNAL MEDICINE

## 2025-01-15 RX ORDER — AZITHROMYCIN 250 MG/1
TABLET, FILM COATED ORAL
Qty: 6 TABLET | Refills: 0 | Status: SHIPPED | OUTPATIENT
Start: 2025-01-15 | End: 2025-01-19

## 2025-01-15 NOTE — ASSESSMENT & PLAN NOTE
She has been having cough with phlegm which is occasionally yellow.  On clinical examination, her chest was clear to auscultation.  I have ordered a course of azithromycin.  Orders:    azithromycin (ZITHROMAX) 250 mg tablet; Take 2 tablets today then 1 tablet daily x 4 days (Patient not taking: Reported on 1/16/2025)

## 2025-01-15 NOTE — ASSESSMENT & PLAN NOTE
Her CT scan of the chest from 8/11/2024 showed reticular and fibrotic changes bilaterally along with some honeycombing at the right lung base.  Etiology is not clear at this point.  She has history of dry mouth, joint pains and back pain.   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  COPD emphysema. Her ILD panel was negative.  Her PFT showed mild diffusion defect.  She mentioned features suggestive of Raynaud's phenomenon swallowing problems joint pains and rashes.  I have recommended a rheumatology consultation which she has not done yet.

## 2025-01-15 NOTE — PROGRESS NOTES
Name: Awilda Saul      : 1960      MRN: [id number]  Encounter Provider: Florecita Walker MD  Encounter Date: 1/15/2025   Encounter department: [facility]  :  Assessment & Plan  Pulmonary emphysema, unspecified emphysema type (HCC)  Ms.Sheila Saul has emphysema from her smoking.  Currently her exercise tolerance was about 2 blocks.  She had cough with yellow phlegm.  She had wheezing.  On clinical examination, her chest was clear to auscultation.  The review of her CT scan showed evidence of structural emphysema and fibrosis.  Her PFT showed mild diffusion defect.  Normal spirometry.  I advised her to continue with Breztri regularly and albuterol as needed.  Recently had an acute exacerbation and has been having cough with phlegm.  I have ordered a Z-Tono.  I had a long discussion with her and answered all her questions.          Pulmonary fibrosis (HCC)  Her CT scan of the chest from 2024 showed reticular and fibrotic changes bilaterally along with some honeycombing at the right lung base.  Etiology is not clear at this point.  She has history of dry mouth, joint pains and back pain.   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  COPD emphysema. Her ILD panel was negative.  Her PFT showed mild diffusion defect.  She mentioned features suggestive of Raynaud's phenomenon swallowing problems joint pains and rashes.  I have recommended a rheumatology consultation which she has not done yet.          Tobacco use disorder  She has been a smoker since age 16 and was smoking about a pack a day.  She states that she quit few months back.  I counseled her to remain quit.          Tracheobronchitis  She has been having cough with phlegm which is occasionally yellow.  On clinical examination, her chest was clear to auscultation.  I have ordered a course of azithromycin.  Orders:    azithromycin (ZITHROMAX) 250 mg tablet; Take 2  tablets today then 1 tablet daily x 4 days (Patient not taking: Reported on 1/16/2025)    Class 2 obesity due to excess calories without serious comorbidity with body mass index (BMI) of 35.0 to 35.9 in adult  She is obese and I counseled her to lose weight.           History of Present Illness   HPI  Awilda Saul is a 64 y.o. female with past medical history of COPD pulmonary fibrosis tobacco use disorder and obesity who has come for follow-up.  She stated that she has not been doing well for the past couple of weeks.  She had an upper respiratory tract infection which caused her to have cough shortness of breath and chest pain.  It lasted for 3 to 4 days and currently is much better.  She continues to have cough with yellow phlegm.  No hemoptysis no chest pain no palpitations now.  Her appetite is good.  He has not lost any significant weight.  No fever or chills.  She has been using Breztri regularly and albuterol as needed.  She rarely uses her rescue inhaler.  She had no hoarseness of voice or dysphagia.  She is admitted that she has not smoked for about 3 months now she seems very motivated to remain quit.  She is currently having some problems with her family with her daughter and she is very upset.  She is obese and understands the need for weight reduction.  She likely has acute tracheobronchitis and I given her a course of azithromycin.      Review of Systems   Constitutional:  Positive for appetite change. Negative for chills and fever.   HENT:  Positive for sneezing. Negative for hearing loss, rhinorrhea, sore throat and trouble swallowing.    Respiratory:  Positive for cough (yellow phlegm), shortness of breath and wheezing. Negative for chest tightness.    Cardiovascular:  Positive for chest pain. Negative for palpitations and leg swelling.   Gastrointestinal:  Positive for abdominal pain, diarrhea and nausea. Negative for constipation and vomiting.   Genitourinary:  Positive for urgency. Negative  "for dysuria and frequency.   Musculoskeletal:  Positive for arthralgias and back pain. Negative for gait problem.   Skin:  Positive for rash.   Allergic/Immunologic: Positive for environmental allergies.   Neurological:  Positive for dizziness, light-headedness and headaches. Negative for syncope.   Psychiatric/Behavioral:  Negative for agitation, confusion and sleep disturbance. The patient is not nervous/anxious.           Objective   /80 (BP Location: Left arm, Patient Position: Sitting, Cuff Size: Standard)   Pulse 102   Temp (!) 96.7 °F (35.9 °C) (Tympanic)   Ht 5' 3\" (1.6 m)   Wt 88.9 kg (196 lb)   SpO2 98%   BMI 34.72 kg/m²      Physical Exam  Vitals reviewed.   Constitutional:       General: She is not in acute distress.     Appearance: She is obese. She is not ill-appearing, toxic-appearing or diaphoretic.      Interventions: She is not intubated.  HENT:      Head: Normocephalic.      Mouth/Throat:      Mouth: Mucous membranes are moist.   Neck:      Vascular: No JVD.   Cardiovascular:      Rate and Rhythm: Normal rate.      Heart sounds: Normal heart sounds. No murmur heard.  Pulmonary:      Effort: Pulmonary effort is normal. No tachypnea, bradypnea, accessory muscle usage or respiratory distress. She is not intubated.      Breath sounds: Normal breath sounds. No stridor. No decreased breath sounds, wheezing, rhonchi or rales.   Chest:      Chest wall: No tenderness.   Musculoskeletal:      Right lower leg: No edema.      Left lower leg: No edema.   Lymphadenopathy:      Cervical: No cervical adenopathy.   Skin:     Coloration: Skin is not cyanotic or pale.      Nails: There is no clubbing.   Neurological:      Mental Status: She is alert and oriented to person, place, and time.   Psychiatric:         Mood and Affect: Mood is anxious.         Behavior: Behavior normal. Behavior is not agitated.           "

## 2025-01-15 NOTE — ASSESSMENT & PLAN NOTE
Ms.Sheila Saul has emphysema from her smoking.  Currently her exercise tolerance was about 2 blocks.  She had cough with yellow phlegm.  She had wheezing.  On clinical examination, her chest was clear to auscultation.  The review of her CT scan showed evidence of structural emphysema and fibrosis.  Her PFT showed mild diffusion defect.  Normal spirometry.  I advised her to continue with Breztri regularly and albuterol as needed.  Recently had an acute exacerbation and has been having cough with phlegm.  I have ordered a Z-Tono.  I had a long discussion with her and answered all her questions.

## 2025-01-16 ENCOUNTER — TELEPHONE (OUTPATIENT)
Age: 65
End: 2025-01-16

## 2025-01-16 ENCOUNTER — OFFICE VISIT (OUTPATIENT)
Dept: FAMILY MEDICINE CLINIC | Facility: CLINIC | Age: 65
End: 2025-01-16
Payer: COMMERCIAL

## 2025-01-16 ENCOUNTER — PREP FOR PROCEDURE (OUTPATIENT)
Age: 65
End: 2025-01-16

## 2025-01-16 VITALS
SYSTOLIC BLOOD PRESSURE: 122 MMHG | WEIGHT: 195 LBS | OXYGEN SATURATION: 97 % | TEMPERATURE: 96.7 F | HEIGHT: 63 IN | HEART RATE: 107 BPM | DIASTOLIC BLOOD PRESSURE: 82 MMHG | RESPIRATION RATE: 18 BRPM | BODY MASS INDEX: 34.55 KG/M2

## 2025-01-16 DIAGNOSIS — J84.10 PULMONARY FIBROSIS (HCC): ICD-10-CM

## 2025-01-16 DIAGNOSIS — B37.2 INTERTRIGINOUS CANDIDIASIS: ICD-10-CM

## 2025-01-16 DIAGNOSIS — G89.29 CHRONIC BILATERAL LOW BACK PAIN WITHOUT SCIATICA: ICD-10-CM

## 2025-01-16 DIAGNOSIS — E66.09 CLASS 2 OBESITY DUE TO EXCESS CALORIES WITHOUT SERIOUS COMORBIDITY WITH BODY MASS INDEX (BMI) OF 35.0 TO 35.9 IN ADULT: ICD-10-CM

## 2025-01-16 DIAGNOSIS — M54.50 CHRONIC BILATERAL LOW BACK PAIN WITHOUT SCIATICA: ICD-10-CM

## 2025-01-16 DIAGNOSIS — Z12.11 SCREENING FOR COLON CANCER: Primary | ICD-10-CM

## 2025-01-16 DIAGNOSIS — R39.9 LOWER URINARY TRACT SYMPTOMS: ICD-10-CM

## 2025-01-16 DIAGNOSIS — Z86.0100 HISTORY OF COLON POLYPS: Primary | ICD-10-CM

## 2025-01-16 DIAGNOSIS — J41.0 SIMPLE CHRONIC BRONCHITIS (HCC): ICD-10-CM

## 2025-01-16 DIAGNOSIS — R79.89 ABNORMAL TSH: ICD-10-CM

## 2025-01-16 DIAGNOSIS — E78.2 MIXED HYPERLIPIDEMIA: ICD-10-CM

## 2025-01-16 DIAGNOSIS — E66.812 CLASS 2 OBESITY DUE TO EXCESS CALORIES WITHOUT SERIOUS COMORBIDITY WITH BODY MASS INDEX (BMI) OF 35.0 TO 35.9 IN ADULT: ICD-10-CM

## 2025-01-16 PROCEDURE — 99214 OFFICE O/P EST MOD 30 MIN: CPT | Performed by: FAMILY MEDICINE

## 2025-01-16 RX ORDER — NYSTATIN 100000 [USP'U]/G
POWDER TOPICAL 2 TIMES DAILY
Qty: 60 G | Refills: 1 | Status: SHIPPED | OUTPATIENT
Start: 2025-01-16

## 2025-01-16 NOTE — ASSESSMENT & PLAN NOTE
Stable patient using inhaler at this point if symptoms change or worsen she will follow-up closely.  For now obtain all laboratory work and reevaluate here

## 2025-01-16 NOTE — TELEPHONE ENCOUNTER
Pt reports she just saw PCP and discussed her cyst problem. Pt states she wasn't told on what she should do about it and wanted to see if PCP could give her a referral for dermatologist or whomever PCP think she should see for this.  PCP please further advise pt.

## 2025-01-16 NOTE — TELEPHONE ENCOUNTER
Scheduled date of EGD/colonoscopy (as of today): 01/28/2025  Physician performing EGD/colonoscopy: Dr. Dorantes  Location of EGD/colonoscopy: [facility]  Desired bowel prep reviewed with patient: MIKE/DUL  Prep instructions sent via [employer]  Instructions reviewed with patient by: ARIAS  Clearances: N/A

## 2025-01-16 NOTE — ASSESSMENT & PLAN NOTE
patient will begin more exercise program as she feels better healthwise and physically and as the weather warms up reevaluate here for follow-up evaluation in 2 months

## 2025-01-16 NOTE — TELEPHONE ENCOUNTER
01/16/25  Screened by: Paula Jenkins    Referring Provider Dr. Jacobs    Pre- Screening: yes    There is no height or weight on file to calculate BMI.  Has patient been referred for a routine screening Colonoscopy? yes  Is the patient between 45-75 years old? yes      Previous Colonoscopy yes   If yes:    Date:     Facility:     Reason:     Does the patient want to see a Gastroenterologist prior to their procedure OR are they having any GI symptoms? no    Has the patient been hospitalized or had abdominal surgery in the past 6 months? no    Does the patient use supplemental oxygen? no    Does the patient take Coumadin, Lovenox, Plavix, Elliquis, Xarelto, or other blood thinning medication? no    Has the patient had a stroke, cardiac event, or stent placed in the past year? no

## 2025-01-16 NOTE — PROGRESS NOTES
Adult Annual Physical  Name: Awilda Saul      : 1960      MRN: [id number]  Encounter Provider: Joe Jacobs DO  Encounter Date: 2025   Encounter department: [facility]    Assessment & Plan    Immunizations and preventive care screenings were discussed with patient today. Appropriate education was printed on patient's after visit summary.    Counseling:  {Annual Physical; Counselin}         History of Present Illness   {?Quick Links Encounters * My Last Note * Last Note in Specialty * Snapshot * Since Last Visit * History :03968}  Adult Annual Physical  Review of Systems  {Select to Display PMH (Optional):29225}    Objective {?Quick Links Trend Vitals * Enter New Vitals * Results Review * Timeline (Adult) * Labs * Imaging * Cardiology * Procedures * Lung Cancer Screening * Surgical eConsent :07536}  There were no vitals taken for this visit.    Physical Exam  {Administrative / Billing Section (Optional):33899}

## 2025-01-16 NOTE — ASSESSMENT & PLAN NOTE
Stable recent flareup with bronchitis causing worsening symptoms given antibiotics along with inhaler and will follow-up if not improved with chest x-ray imaging studies          PAST SURGICAL HISTORY:   delivery delivered     S/P breast lumpectomy Left breast    S/P knee surgery right meniscus

## 2025-01-16 NOTE — ASSESSMENT & PLAN NOTE
Chronic pain will refer for physical therapy when symptoms change or worsen work on weight reduction diet and will obtain all laboratory work now

## 2025-01-17 ENCOUNTER — APPOINTMENT (OUTPATIENT)
Dept: LAB | Facility: CLINIC | Age: 65
End: 2025-01-17
Payer: COMMERCIAL

## 2025-01-17 DIAGNOSIS — R39.9 LOWER URINARY TRACT SYMPTOMS: ICD-10-CM

## 2025-01-17 LAB
ALBUMIN SERPL BCG-MCNC: 4.2 G/DL (ref 3.5–5)
ALP SERPL-CCNC: 105 U/L (ref 34–104)
ALT SERPL W P-5'-P-CCNC: 12 U/L (ref 7–52)
ANION GAP SERPL CALCULATED.3IONS-SCNC: 7 MMOL/L (ref 4–13)
AST SERPL W P-5'-P-CCNC: 14 U/L (ref 13–39)
BASOPHILS # BLD AUTO: 0.03 THOUSANDS/ΜL (ref 0–0.1)
BASOPHILS NFR BLD AUTO: 0 % (ref 0–1)
BILIRUB SERPL-MCNC: 0.31 MG/DL (ref 0.2–1)
BILIRUB UR QL STRIP: NEGATIVE
BUN SERPL-MCNC: 12 MG/DL (ref 5–25)
CALCIUM SERPL-MCNC: 9.6 MG/DL (ref 8.4–10.2)
CHLORIDE SERPL-SCNC: 99 MMOL/L (ref 96–108)
CHOLEST SERPL-MCNC: 195 MG/DL (ref ?–200)
CLARITY UR: CLEAR
CO2 SERPL-SCNC: 31 MMOL/L (ref 21–32)
COLOR UR: COLORLESS
CREAT SERPL-MCNC: 0.59 MG/DL (ref 0.6–1.3)
EOSINOPHIL # BLD AUTO: 0.21 THOUSAND/ΜL (ref 0–0.61)
EOSINOPHIL NFR BLD AUTO: 2 % (ref 0–6)
ERYTHROCYTE [DISTWIDTH] IN BLOOD BY AUTOMATED COUNT: 12.9 % (ref 11.6–15.1)
GFR SERPL CREATININE-BSD FRML MDRD: 98 ML/MIN/1.73SQ M
GLUCOSE P FAST SERPL-MCNC: 109 MG/DL (ref 65–99)
GLUCOSE UR STRIP-MCNC: NEGATIVE MG/DL
HCT VFR BLD AUTO: 44.9 % (ref 34.8–46.1)
HDLC SERPL-MCNC: 45 MG/DL
HGB BLD-MCNC: 14.4 G/DL (ref 11.5–15.4)
HGB UR QL STRIP.AUTO: NEGATIVE
IMM GRANULOCYTES # BLD AUTO: 0.03 THOUSAND/UL (ref 0–0.2)
IMM GRANULOCYTES NFR BLD AUTO: 0 % (ref 0–2)
KETONES UR STRIP-MCNC: NEGATIVE MG/DL
LDLC SERPL CALC-MCNC: 110 MG/DL (ref 0–100)
LEUKOCYTE ESTERASE UR QL STRIP: NEGATIVE
LYMPHOCYTES # BLD AUTO: 3.72 THOUSANDS/ΜL (ref 0.6–4.47)
LYMPHOCYTES NFR BLD AUTO: 35 % (ref 14–44)
MCH RBC QN AUTO: 29.4 PG (ref 26.8–34.3)
MCHC RBC AUTO-ENTMCNC: 32.1 G/DL (ref 31.4–37.4)
MCV RBC AUTO: 92 FL (ref 82–98)
MONOCYTES # BLD AUTO: 0.78 THOUSAND/ΜL (ref 0.17–1.22)
MONOCYTES NFR BLD AUTO: 7 % (ref 4–12)
NEUTROPHILS # BLD AUTO: 5.76 THOUSANDS/ΜL (ref 1.85–7.62)
NEUTS SEG NFR BLD AUTO: 56 % (ref 43–75)
NITRITE UR QL STRIP: NEGATIVE
NONHDLC SERPL-MCNC: 150 MG/DL
NRBC BLD AUTO-RTO: 0 /100 WBCS
PH UR STRIP.AUTO: 6.5 [PH]
PLATELET # BLD AUTO: 340 THOUSANDS/UL (ref 149–390)
PMV BLD AUTO: 10.8 FL (ref 8.9–12.7)
POTASSIUM SERPL-SCNC: 4.4 MMOL/L (ref 3.5–5.3)
PROT SERPL-MCNC: 6.6 G/DL (ref 6.4–8.4)
PROT UR STRIP-MCNC: NEGATIVE MG/DL
RBC # BLD AUTO: 4.9 MILLION/UL (ref 3.81–5.12)
SODIUM SERPL-SCNC: 137 MMOL/L (ref 135–147)
SP GR UR STRIP.AUTO: 1.01 (ref 1–1.03)
T4 FREE SERPL-MCNC: 0.79 NG/DL (ref 0.61–1.12)
TRIGL SERPL-MCNC: 198 MG/DL (ref ?–150)
TSH SERPL DL<=0.05 MIU/L-ACNC: 0.39 UIU/ML (ref 0.45–4.5)
UROBILINOGEN UR STRIP-ACNC: <2 MG/DL
WBC # BLD AUTO: 10.53 THOUSAND/UL (ref 4.31–10.16)

## 2025-01-17 PROCEDURE — 80053 COMPREHEN METABOLIC PANEL: CPT

## 2025-01-17 PROCEDURE — 82570 ASSAY OF URINE CREATININE: CPT

## 2025-01-17 PROCEDURE — 36415 COLL VENOUS BLD VENIPUNCTURE: CPT

## 2025-01-17 PROCEDURE — 82043 UR ALBUMIN QUANTITATIVE: CPT

## 2025-01-17 PROCEDURE — 84439 ASSAY OF FREE THYROXINE: CPT

## 2025-01-17 PROCEDURE — 80061 LIPID PANEL: CPT

## 2025-01-17 PROCEDURE — 85025 COMPLETE CBC W/AUTO DIFF WBC: CPT

## 2025-01-17 PROCEDURE — 84443 ASSAY THYROID STIM HORMONE: CPT

## 2025-01-17 PROCEDURE — 81003 URINALYSIS AUTO W/O SCOPE: CPT

## 2025-01-18 LAB
CREAT UR-MCNC: 32.3 MG/DL
MICROALBUMIN UR-MCNC: 7.6 MG/L
MICROALBUMIN/CREAT 24H UR: 24 MG/G CREATININE (ref 0–30)

## 2025-01-20 ENCOUNTER — TELEPHONE (OUTPATIENT)
Age: 65
End: 2025-01-20

## 2025-01-20 NOTE — TELEPHONE ENCOUNTER
Awilda had labs done on 1/17/2025. She asks if PCP has reviewed the results yet, states she is concerned about the abnormal results. She requests PCP recommendation.

## 2025-01-27 ENCOUNTER — TELEPHONE (OUTPATIENT)
Dept: GASTROENTEROLOGY | Facility: HOSPITAL | Age: 65
End: 2025-01-27

## 2025-01-28 ENCOUNTER — ANESTHESIA EVENT (OUTPATIENT)
Dept: GASTROENTEROLOGY | Facility: HOSPITAL | Age: 65
End: 2025-01-28
Payer: COMMERCIAL

## 2025-01-28 ENCOUNTER — ANESTHESIA (OUTPATIENT)
Dept: GASTROENTEROLOGY | Facility: HOSPITAL | Age: 65
End: 2025-01-28
Payer: COMMERCIAL

## 2025-01-28 ENCOUNTER — HOSPITAL ENCOUNTER (OUTPATIENT)
Dept: GASTROENTEROLOGY | Facility: HOSPITAL | Age: 65
Setting detail: OUTPATIENT SURGERY
Discharge: HOME | End: 2025-01-28
Attending: INTERNAL MEDICINE
Payer: COMMERCIAL

## 2025-01-28 VITALS
HEART RATE: 77 BPM | HEIGHT: 63 IN | RESPIRATION RATE: 18 BRPM | SYSTOLIC BLOOD PRESSURE: 134 MMHG | OXYGEN SATURATION: 99 % | WEIGHT: 196.87 LBS | BODY MASS INDEX: 34.88 KG/M2 | TEMPERATURE: 98 F | DIASTOLIC BLOOD PRESSURE: 60 MMHG

## 2025-01-28 DIAGNOSIS — Z86.0100 HISTORY OF COLON POLYPS: ICD-10-CM

## 2025-01-28 PROCEDURE — 45385 COLONOSCOPY W/LESION REMOVAL: CPT | Performed by: INTERNAL MEDICINE

## 2025-01-28 PROCEDURE — 88305 TISSUE EXAM BY PATHOLOGIST: CPT | Performed by: PATHOLOGY

## 2025-01-28 PROCEDURE — 45380 COLONOSCOPY AND BIOPSY: CPT | Performed by: INTERNAL MEDICINE

## 2025-01-28 RX ORDER — PROPOFOL 10 MG/ML
INJECTION, EMULSION INTRAVENOUS AS NEEDED
Status: DISCONTINUED | OUTPATIENT
Start: 2025-01-28 | End: 2025-01-28

## 2025-01-28 RX ORDER — LIDOCAINE HYDROCHLORIDE 10 MG/ML
INJECTION, SOLUTION EPIDURAL; INFILTRATION; INTRACAUDAL; PERINEURAL AS NEEDED
Status: DISCONTINUED | OUTPATIENT
Start: 2025-01-28 | End: 2025-01-28

## 2025-01-28 RX ORDER — SODIUM CHLORIDE, SODIUM LACTATE, POTASSIUM CHLORIDE, CALCIUM CHLORIDE 600; 310; 30; 20 MG/100ML; MG/100ML; MG/100ML; MG/100ML
75 INJECTION, SOLUTION INTRAVENOUS CONTINUOUS
Status: DISCONTINUED | OUTPATIENT
Start: 2025-01-28 | End: 2025-02-01 | Stop reason: HOSPADM

## 2025-01-28 RX ADMIN — PROPOFOL 50 MG: 10 INJECTION, EMULSION INTRAVENOUS at 09:29

## 2025-01-28 RX ADMIN — SODIUM CHLORIDE, SODIUM LACTATE, POTASSIUM CHLORIDE, AND CALCIUM CHLORIDE 75 ML/HR: .6; .31; .03; .02 INJECTION, SOLUTION INTRAVENOUS at 08:04

## 2025-01-28 RX ADMIN — PROPOFOL 150 MG: 10 INJECTION, EMULSION INTRAVENOUS at 09:23

## 2025-01-28 RX ADMIN — LIDOCAINE HYDROCHLORIDE 50 MG: 10 INJECTION, SOLUTION EPIDURAL; INFILTRATION; INTRACAUDAL; PERINEURAL at 09:23

## 2025-01-28 RX ADMIN — PROPOFOL 50 MG: 10 INJECTION, EMULSION INTRAVENOUS at 09:26

## 2025-01-28 RX ADMIN — PROPOFOL 20 MG: 10 INJECTION, EMULSION INTRAVENOUS at 09:32

## 2025-01-28 NOTE — INTERVAL H&P NOTE
H&P reviewed. After examining the patient I find no changes in the patients condition since the H&P had been written.    Vitals:    01/28/25 0756   BP: 119/70   Pulse: 91   Resp: 20   Temp: 97.6 °F (36.4 °C)   SpO2: 99%

## 2025-01-28 NOTE — INTERVAL H&P NOTE
H&P reviewed.  Patient now says she has been having diarrhea for quite some time and has a history of polyps more than 10 years ago.  Other than this, after examining the patient I find no changes in the patients condition since the H&P had been written.    Vitals:    01/28/25 0756   BP: 119/70   Pulse: 91   Resp: 20   Temp: 97.6 °F (36.4 °C)   SpO2: 99%

## 2025-01-28 NOTE — ANESTHESIA PREPROCEDURE EVALUATION
Procedure:  COLONOSCOPY    Relevant Problems   MUSCULOSKELETAL   (+) Chronic bilateral low back pain without sciatica      NEURO/PSYCH   (+) Chronic bilateral low back pain without sciatica      PULMONARY   (+) COPD (chronic obstructive pulmonary disease) (HCC)        Physical Exam    Airway    Mallampati score: III         Dental   Comment: Upper partial denture     Cardiovascular  Cardiovascular exam normal    Pulmonary  Pulmonary exam normal     Other Findings  post-pubertal.      Anesthesia Plan  ASA Score- 2     Anesthesia Type- IV sedation with anesthesia with ASA Monitors.         Additional Monitors:     Airway Plan:            Plan Factors-Exercise tolerance (METS): >4 METS.    Chart reviewed. EKG reviewed. Imaging results reviewed. Existing labs reviewed. Patient summary reviewed.    Patient is not a current smoker.      There is medical exclusion for perioperative obstructive sleep apnea risk education.        Induction- intravenous.    Postoperative Plan-     Perioperative Resuscitation Plan - Level 1 - Full Code.       Informed Consent- Anesthetic plan and risks discussed with patient.  I personally reviewed this patient with the CRNA. Discussed and agreed on the Anesthesia Plan with the CRNA..    The common risks and benefits of sedation were discussed including aspiration and respiratory failure, corneal abrasion, injury to teeth and other oropharyngeal structures, and PONV.  I also explained that moderate and deep sedation is not general anesthesia and, though unlikely, there is a chance of awareness including recall as the overall aim of sedation is to maintaining patient comfort during the procedure.  The patient and family were given the opportunity to ask questions and all questions were addressed at the time of the pre-op evaluation and consent.      NPO Status:  Vitals Value Taken Time   Date of last liquid 01/28/25 01/28/25 0800   Time of last liquid 0730 01/28/25 0800   Date of last solid  01/27/25 01/28/25 0800   Time of last solid 0800 01/28/25 0800

## 2025-01-28 NOTE — H&P
"History and Physical -  [facility]  Awilda Saul 64 y.o. female MRN: [id number]                  HPI: Awilda Saul is a 64 y.o. year old female who presents for average risk screening colonoscopy.  Last colonoscopy more than 10 years ago      REVIEW OF SYSTEMS: Per the HPI, and otherwise unremarkable.    Historical Information   Past Medical History:   Diagnosis Date    Colon polyp     COPD (chronic obstructive pulmonary disease) (HCC)     Lung fibrosis (HCC)     mild    Osteoporosis     Sinusitis     Sleep apnea, obstructive      Past Surgical History:   Procedure Laterality Date    COLONOSCOPY      HEMORROIDECTOMY      VEIN LIGATION AND STRIPPING       Social History   Social History     Substance and Sexual Activity   Alcohol Use No     Social History     Substance and Sexual Activity   Drug Use Never     Social History     Tobacco Use   Smoking Status Former    Current packs/day: 0.00    Average packs/day: 1 pack/day for 45.0 years (45.0 ttl pk-yrs)    Types: Cigarettes    Start date: 1979    Quit date: 2024    Years since quittin.4   Smokeless Tobacco Never     Family History   Problem Relation Age of Onset    No Known Problems Mother     Heart disease Father     Lung cancer Father     No Known Problems Sister     No Known Problems Sister     No Known Problems Sister     No Known Problems Maternal Grandmother     No Known Problems Maternal Grandfather     Cancer Paternal Grandmother     No Known Problems Paternal Grandfather     Breast cancer Maternal Aunt         60s    Breast cancer Paternal Aunt     Prostate cancer Brother        Meds/Allergies     Not in a hospital admission.    Allergies   Allergen Reactions    Citalopram Diarrhea    Penicillins Hives    Pregabalin GI Intolerance       Objective     Blood pressure 119/70, pulse 91, temperature 97.6 °F (36.4 °C), temperature source Temporal, resp. rate 20, height 5' 3\" (1.6 m), weight 89.3 kg (196 lb 13.9 oz), SpO2 " 99%.      PHYSICAL EXAM    Gen: NAD  CV: RRR  CHEST: Clear  ABD: soft, NT/ND  EXT: no edema  Neuro: AAO      ASSESSMENT/PLAN:  This is a 64 y.o. year old female here for average risk screening    PLAN:   Procedure: Colonoscopy

## 2025-01-28 NOTE — ANESTHESIA POSTPROCEDURE EVALUATION
Post-Op Assessment Note    CV Status:  Stable  Pain Score: 0    Pain management: adequate       Mental Status:  Arousable   Hydration Status:  Stable   PONV Controlled:  None   Airway Patency:  Patent     Post Op Vitals Reviewed: Yes    No anethesia notable event occurred.    Staff: CRNA           Last Filed PACU Vitals:  Vitals Value Taken Time   Temp 98 °F (36.7 °C) 01/28/25 0939   Pulse 82 01/28/25 0939   BP 94/65 01/28/25 0939   Resp 18 01/28/25 0939   SpO2 98 % 01/28/25 0939       Modified Su:     Vitals Value Taken Time   Activity 2 01/28/25 0939   Respiration 2 01/28/25 0939   Circulation 2 01/28/25 0939   Consciousness 1 01/28/25 0939   Oxygen Saturation 2 01/28/25 0939     Modified Su Score: 9

## 2025-01-30 ENCOUNTER — TELEPHONE (OUTPATIENT)
Age: 65
End: 2025-01-30

## 2025-01-30 ENCOUNTER — NURSE TRIAGE (OUTPATIENT)
Age: 65
End: 2025-01-30

## 2025-01-30 PROCEDURE — 88305 TISSUE EXAM BY PATHOLOGIST: CPT | Performed by: PATHOLOGY

## 2025-01-30 NOTE — TELEPHONE ENCOUNTER
"Please review     Pt transferred to nurses line.     Pt reports yesterday started with RLQ pain and today is now having left sided flank pain 8/10. Colonoscopy was done yesterday.   No rectal bleeding, fevers, n.v    I did advise with severity of pain to go to the ED. She reports if she gets worse she will. She would like to hear from Dr. Dorantes with results. Please advise- pt sounds to be in a lot of pain.       Reason for Disposition   SEVERE abdomen pain (e.g., excruciating)    Answer Assessment - Initial Assessment Questions  1. DATE/TIME: \"When did you have your colonoscopy?\"       1/28/25   2. MAIN CONCERN: \"What is your main concern right now?\" \"What questions do you have?\"      RLQ pain started yesterday   Flank pain on left side started today   3. ABDOMEN PAIN: \"Are you having any abdomen (belly or stomach) pain?\" If Yes, ask: \"How bad is it?\" (e.g., Scale 1-10; mild, moderate, severe).     - MILD (1-3): doesn't interfere with normal activities, abdomen soft and not tender to touch      - MODERATE (4-7): interferes with normal activities or awakens from sleep, tender to touch      - SEVERE (8-10): excruciating pain, doubled over, unable to do any normal activities        8/10   Comes and goes   4. OTHER SYMPTOMS: \"What other symptoms are you having?\" (e.g., rectal bleeding, bloating or feeling gassy, passing gas, vomiting, dizziness, fever).      No not now   5. ONSET: \"When did your symptoms start?\"      Yesterday   6. PATTERN: \"Is the symptom(s) constant or does it come and go?\" \"Is your symptom(s) getting worse, better, or staying the same?\"      Worsen    Protocols used: GI-Colonoscopy Symptoms and Questions-ADULT-[place]    "
Left message on machine with result and reiterated that she should go to ER if the pain is bad.  Colonoscopy was 2 days ago.  
I have personally evaluated and examined the patient. The Attending was available to me as a supervising provider if needed.

## 2025-01-30 NOTE — TELEPHONE ENCOUNTER
Patients GI provider:  Dr. Dorantes    Number to return call: [phone]    Reason for call: Pt calling to f/u on results of colonoscopy. Doc to review and comment. Please call pt once results are available.    Pt is having pain in left abdominal area since yesterday, just started having pain in right flank area. Pt sounds to be in a lot of pain. Transferred to [facility] in triage for further assistance.     Scheduled procedure/appointment date if applicable: N/A

## 2025-02-19 DIAGNOSIS — J43.9 PULMONARY EMPHYSEMA, UNSPECIFIED EMPHYSEMA TYPE (HCC): ICD-10-CM

## 2025-02-20 RX ORDER — ALBUTEROL SULFATE 90 UG/1
2 INHALANT RESPIRATORY (INHALATION) EVERY 6 HOURS PRN
Qty: 18 G | Refills: 5 | Status: SHIPPED | OUTPATIENT
Start: 2025-02-20

## 2025-03-20 ENCOUNTER — OFFICE VISIT (OUTPATIENT)
Dept: FAMILY MEDICINE CLINIC | Facility: CLINIC | Age: 65
End: 2025-03-20
Payer: COMMERCIAL

## 2025-03-20 VITALS
SYSTOLIC BLOOD PRESSURE: 120 MMHG | HEART RATE: 107 BPM | HEIGHT: 63 IN | OXYGEN SATURATION: 97 % | RESPIRATION RATE: 18 BRPM | BODY MASS INDEX: 35.05 KG/M2 | WEIGHT: 197.8 LBS | TEMPERATURE: 96.7 F | DIASTOLIC BLOOD PRESSURE: 80 MMHG

## 2025-03-20 DIAGNOSIS — K58.0 IRRITABLE BOWEL SYNDROME WITH DIARRHEA: ICD-10-CM

## 2025-03-20 DIAGNOSIS — Z00.00 ANNUAL PHYSICAL EXAM: ICD-10-CM

## 2025-03-20 DIAGNOSIS — G89.29 CHRONIC BILATERAL LOW BACK PAIN WITHOUT SCIATICA: ICD-10-CM

## 2025-03-20 DIAGNOSIS — J41.0 SIMPLE CHRONIC BRONCHITIS (HCC): Primary | ICD-10-CM

## 2025-03-20 DIAGNOSIS — M54.50 CHRONIC BILATERAL LOW BACK PAIN WITHOUT SCIATICA: ICD-10-CM

## 2025-03-20 DIAGNOSIS — E66.812 CLASS 2 OBESITY DUE TO EXCESS CALORIES WITHOUT SERIOUS COMORBIDITY WITH BODY MASS INDEX (BMI) OF 35.0 TO 35.9 IN ADULT: ICD-10-CM

## 2025-03-20 DIAGNOSIS — J84.10 PULMONARY FIBROSIS (HCC): ICD-10-CM

## 2025-03-20 DIAGNOSIS — E66.09 CLASS 2 OBESITY DUE TO EXCESS CALORIES WITHOUT SERIOUS COMORBIDITY WITH BODY MASS INDEX (BMI) OF 35.0 TO 35.9 IN ADULT: ICD-10-CM

## 2025-03-20 DIAGNOSIS — R10.11 RIGHT UPPER QUADRANT PAIN: ICD-10-CM

## 2025-03-20 PROCEDURE — 99214 OFFICE O/P EST MOD 30 MIN: CPT | Performed by: FAMILY MEDICINE

## 2025-03-20 PROCEDURE — 99396 PREV VISIT EST AGE 40-64: CPT | Performed by: FAMILY MEDICINE

## 2025-03-20 RX ORDER — DICYCLOMINE HYDROCHLORIDE 10 MG/1
10 CAPSULE ORAL
Qty: 120 CAPSULE | Refills: 3 | Status: SHIPPED | OUTPATIENT
Start: 2025-03-20

## 2025-03-20 NOTE — ASSESSMENT & PLAN NOTE
Add fiber and also Bentyl and if not improved will follow-up with me closely.  Patient admits to 7 bowel movements per day at least all loose starting in the morning and I will ask her to try probiotics specifically for diarrhea digestive advantage or align    Orders:    dicyclomine (BENTYL) 10 mg capsule; Take 1 capsule (10 mg total) by mouth 4 (four) times a day (before meals and at bedtime)

## 2025-03-20 NOTE — ASSESSMENT & PLAN NOTE
Recommend a daily stretching program weight reduction and if not improved follow-up with physical therapy

## 2025-03-20 NOTE — ASSESSMENT & PLAN NOTE
Work on diet exercise weight reduction and  on activity throughout the spring and summer now as we approach better weather encouraged patient to take daily walks reevaluate at next visit here in 6 months

## 2025-03-20 NOTE — ASSESSMENT & PLAN NOTE
COPD is stable currently using albuterol as needed along with Breztri.  Follow-up with me as scheduled next office visit

## 2025-03-20 NOTE — ASSESSMENT & PLAN NOTE
No worsening shortness of breath patient doing better during current weather pattern now cooler mornings and less heat in the home with springlike weather continue all medications inhaler as directed and follow-up if no improvement

## 2025-03-20 NOTE — PROGRESS NOTES
Adult Annual Physical  Name: Awilda Saul      : 1960      MRN: [id number]  Encounter Provider: Joe Jacobs DO  Encounter Date: 3/20/2025   Encounter department: [facility]    Assessment & Plan  Simple chronic bronchitis (HCC)  COPD is stable currently using albuterol as needed along with Breztri.  Follow-up with me as scheduled next office visit         Class 2 obesity due to excess calories without serious comorbidity with body mass index (BMI) of 35.0 to 35.9 in adult    Work on diet exercise weight reduction and  on activity throughout the spring and summer now as we approach better weather encouraged patient to take daily walks reevaluate at next visit here in 6 months         Chronic bilateral low back pain without sciatica  Recommend a daily stretching program weight reduction and if not improved follow-up with physical therapy         Pulmonary fibrosis (HCC)  No worsening shortness of breath patient doing better during current weather pattern now cooler mornings and less heat in the home with springlike weather continue all medications inhaler as directed and follow-up if no improvement         Right upper quadrant pain    Orders:    US abdomen complete; Future    Irritable bowel syndrome with diarrhea  Add fiber and also Bentyl and if not improved will follow-up with me closely.  Patient admits to 7 bowel movements per day at least all loose starting in the morning and I will ask her to try probiotics specifically for diarrhea digestive advantage or align    Orders:    dicyclomine (BENTYL) 10 mg capsule; Take 1 capsule (10 mg total) by mouth 4 (four) times a day (before meals and at bedtime)    Annual physical exam         Preventive Screenings:    - Breast cancer screening: screening up-to-date     Immunizations:  - Immunizations due: Influenza, Prevnar 20, Zoster (Shingrix) and Hepatitis A         History of Present Illness     Adult Annual  "Physical:  Patient presents for annual physical.     Diet and Physical Activity:  - Diet/Nutrition: no special diet.  - Exercise: no formal exercise.    Depression Screening:  - PHQ-2 Score: 2    General Health:  - Sleep: sleeps poorly.  - Hearing: normal hearing bilateral ears.  - Vision: no vision problems.  - Dental: regular dental visits.    Review of Systems   Constitutional:  Negative for chills, fatigue and fever.   HENT:  Negative for congestion, nosebleeds, rhinorrhea, sinus pressure and sore throat.    Eyes:  Negative for discharge and redness.   Respiratory:  Negative for cough and shortness of breath.    Cardiovascular:  Negative for chest pain, palpitations and leg swelling.   Gastrointestinal:  Negative for abdominal pain, blood in stool and nausea.   Endocrine: Negative for cold intolerance, heat intolerance and polyuria.   Genitourinary:  Negative for dysuria and frequency.   Musculoskeletal:  Negative for arthralgias, back pain and myalgias.   Skin:  Negative for rash.   Neurological:  Negative for dizziness, weakness and headaches.   Hematological:  Negative for adenopathy.   Psychiatric/Behavioral:  Negative for behavioral problems and sleep disturbance. The patient is not nervous/anxious.          Objective   /80 (BP Location: Left arm, Patient Position: Sitting, Cuff Size: Large)   Pulse (!) 107   Temp (!) 96.7 °F (35.9 °C) (Tympanic)   Resp 18   Ht 5' 3\" (1.6 m)   Wt 89.7 kg (197 lb 12.8 oz)   SpO2 97%   BMI 35.04 kg/m²     Physical Exam  Vitals and nursing note reviewed.   Constitutional:       General: She is not in acute distress.     Appearance: Normal appearance. She is well-developed.   HENT:      Head: Normocephalic and atraumatic.      Right Ear: Tympanic membrane and external ear normal.      Left Ear: Tympanic membrane and external ear normal.      Nose: Nose normal.      Mouth/Throat:      Mouth: Mucous membranes are moist.      Pharynx: Oropharynx is clear. No " oropharyngeal exudate.   Eyes:      General: No scleral icterus.        Right eye: No discharge.         Left eye: No discharge.      Conjunctiva/sclera: Conjunctivae normal.      Pupils: Pupils are equal, round, and reactive to light.   Neck:      Thyroid: No thyromegaly.      Vascular: No JVD.   Cardiovascular:      Rate and Rhythm: Normal rate and regular rhythm.      Heart sounds: Normal heart sounds. No murmur heard.  Pulmonary:      Effort: Pulmonary effort is normal.      Breath sounds: No wheezing or rales.   Chest:      Chest wall: No tenderness.   Abdominal:      General: Bowel sounds are normal. There is no distension.      Palpations: Abdomen is soft. There is no mass.      Tenderness: There is no abdominal tenderness.   Musculoskeletal:         General: No tenderness or deformity. Normal range of motion.      Cervical back: Normal range of motion.   Lymphadenopathy:      Cervical: No cervical adenopathy.   Skin:     General: Skin is warm and dry.      Findings: No rash.   Neurological:      General: No focal deficit present.      Mental Status: She is alert and oriented to person, place, and time.      Cranial Nerves: No cranial nerve deficit.      Coordination: Coordination normal.      Deep Tendon Reflexes: Reflexes are normal and symmetric. Reflexes normal.   Psychiatric:         Mood and Affect: Mood normal.         Behavior: Behavior normal.         Thought Content: Thought content normal.         Judgment: Judgment normal.

## 2025-03-27 ENCOUNTER — HOSPITAL ENCOUNTER (OUTPATIENT)
Dept: ULTRASOUND IMAGING | Facility: HOSPITAL | Age: 65
End: 2025-03-27
Attending: FAMILY MEDICINE
Payer: COMMERCIAL

## 2025-03-27 DIAGNOSIS — R10.11 RIGHT UPPER QUADRANT PAIN: ICD-10-CM

## 2025-03-27 PROCEDURE — 76700 US EXAM ABDOM COMPLETE: CPT

## 2025-04-09 ENCOUNTER — TELEPHONE (OUTPATIENT)
Age: 65
End: 2025-04-09

## 2025-04-09 ENCOUNTER — OFFICE VISIT (OUTPATIENT)
Age: 65
End: 2025-04-09
Payer: COMMERCIAL

## 2025-04-09 ENCOUNTER — PREP FOR PROCEDURE (OUTPATIENT)
Age: 65
End: 2025-04-09

## 2025-04-09 ENCOUNTER — APPOINTMENT (OUTPATIENT)
Dept: LAB | Facility: CLINIC | Age: 65
End: 2025-04-09
Payer: COMMERCIAL

## 2025-04-09 VITALS
DIASTOLIC BLOOD PRESSURE: 80 MMHG | HEIGHT: 63 IN | BODY MASS INDEX: 34.91 KG/M2 | HEART RATE: 108 BPM | WEIGHT: 197 LBS | OXYGEN SATURATION: 97 % | SYSTOLIC BLOOD PRESSURE: 138 MMHG

## 2025-04-09 DIAGNOSIS — R13.10 DYSPHAGIA, UNSPECIFIED TYPE: ICD-10-CM

## 2025-04-09 DIAGNOSIS — K58.2 IRRITABLE BOWEL SYNDROME WITH BOTH CONSTIPATION AND DIARRHEA: Primary | ICD-10-CM

## 2025-04-09 DIAGNOSIS — K58.0 IRRITABLE BOWEL SYNDROME WITH DIARRHEA: ICD-10-CM

## 2025-04-09 DIAGNOSIS — R11.2 NAUSEA AND VOMITING, UNSPECIFIED VOMITING TYPE: ICD-10-CM

## 2025-04-09 DIAGNOSIS — K58.2 IRRITABLE BOWEL SYNDROME WITH BOTH CONSTIPATION AND DIARRHEA: ICD-10-CM

## 2025-04-09 DIAGNOSIS — K21.9 GASTROESOPHAGEAL REFLUX DISEASE, UNSPECIFIED WHETHER ESOPHAGITIS PRESENT: ICD-10-CM

## 2025-04-09 DIAGNOSIS — R19.7 DIARRHEA, UNSPECIFIED TYPE: ICD-10-CM

## 2025-04-09 DIAGNOSIS — R10.9 ABDOMINAL PAIN, UNSPECIFIED ABDOMINAL LOCATION: ICD-10-CM

## 2025-04-09 PROCEDURE — 36415 COLL VENOUS BLD VENIPUNCTURE: CPT

## 2025-04-09 PROCEDURE — 82784 ASSAY IGA/IGD/IGG/IGM EACH: CPT

## 2025-04-09 PROCEDURE — 86364 TISS TRNSGLTMNASE EA IG CLAS: CPT

## 2025-04-09 PROCEDURE — 99214 OFFICE O/P EST MOD 30 MIN: CPT | Performed by: PHYSICIAN ASSISTANT

## 2025-04-09 RX ORDER — SODIUM CHLORIDE, SODIUM LACTATE, POTASSIUM CHLORIDE, CALCIUM CHLORIDE 600; 310; 30; 20 MG/100ML; MG/100ML; MG/100ML; MG/100ML
125 INJECTION, SOLUTION INTRAVENOUS CONTINUOUS
Status: CANCELLED | OUTPATIENT
Start: 2025-04-09

## 2025-04-09 RX ORDER — PANTOPRAZOLE SODIUM 40 MG/1
40 TABLET, DELAYED RELEASE ORAL DAILY
Qty: 30 TABLET | Refills: 1 | Status: SHIPPED | OUTPATIENT
Start: 2025-04-09

## 2025-04-09 NOTE — PATIENT INSTRUCTIONS
Scheduled date of EGD(as of today): 4/17/25  Physician performing EGD: Jayna  Location of EGD: [place]  Instructions reviewed with patient by: Ibeth BOYD  Clearances:

## 2025-04-09 NOTE — TELEPHONE ENCOUNTER
Patients GI provider:  Dr. Dorantes    Number to return call: [phone]    Reason for call: Sylvia from [employer] called stated xifaxan needs prior auth. Prior auth can be faxed to [phone]     Scheduled procedure/appointment date if applicable: Apt/procedure

## 2025-04-09 NOTE — TELEPHONE ENCOUNTER
PA for Xifaxan 550 mg    SUBMITTED to [employer]    via    [x]Other website Prompt PA EOC ID [id number]       []PA sent as URGENT    All office notes, labs and other pertaining documents and studies sent. Clinical questions answered. Awaiting determination from insurance company.     Turnaround time for your insurance to make a decision on your Prior Authorization can take 7-21 business days.

## 2025-04-09 NOTE — PROGRESS NOTES
Name: Awilda Saul      : 1960      MRN: [id number]  Encounter Provider: Polly Macdonald PA-C  Encounter Date: 2025   Encounter department: [facility] [place]  :  Assessment & Plan  Irritable bowel syndrome with both constipation and diarrhea    Gastroesophageal reflux disease, unspecified whether esophagitis present    Nausea and vomiting, unspecified vomiting type    Dysphagia, unspecified type    Diarrhea, unspecified type    Abdominal pain, unspecified abdominal location    Patient reports struggling with a long history of abdominal pain, diarrhea (constipation at times as well), bloating, GERD, nausea, vomiting, and also episodes of dysphagia.  She had a colonoscopy with Dr. Dorantes in January which showed 2 polyps removed (1 was a serrated adenoma) amd mild localized erythema in the ileocecal valve; biopsies were completely benign and negative for colitis.  She had a RUQ US which showed a normal gallbladder.     Will plan for EGD with biopsies of the esophagus, stomach, and duodenum to investigate.  Will check celiac serology.  Will check a CT enterography and mesenteric duplex.  Pantoprazole 40mg po daily course x 8 weeks for GERD.  Benefiber daily and low FODMAP diet for IBS.  Will increase Bentyl to 20mg po TID prn abdominal pain.  Will also give a Xifaxan 550mg po TID x 14 days course for IBS-D (note: she reports having significant side effects with antidepressants in the past- thus I do not think she would be a good candidate for a TCA for treatment).  Follow up after the above testing.        History of Present Illness   HPI  Awilda Saul is a pleasant  64 y.o. female who presents to the office for follow up of her gastrointestinal symptoms. Patient reports struggling with a long history of abdominal pain, diarrhea (constipation at times as well), bloating, GERD, nausea, vomiting, and also episodes of dysphagia. She had a colonoscopy with   Jayna in January which showed 2 polyps removed (1 was a serrated adenoma) amd mild localized erythema in the ileocecal valve; biopsies were completely benign and negative for colitis. She had a RUQ US which showed a normal gallbladder. No relief with probiotics/over the counter treatments in the past/antidiarrheals.  She also reports significant side effects with antidepressants in the past and would want to avoid a TCA. No family history of crohn's or celiac disease that she is aware of.    I discussed informed consent with the patient for the EGD. The risks/benefits of the procedure were discussed with the patient. Risks included, but not limited to, infection, bleeding, perforation were discussed. Patient was agreeable.   History obtained from: patient      Medical History Reviewed by provider this encounter:  Meds     .  Past Medical History   Past Medical History:   Diagnosis Date    Allergic     Anemia     Anxiety     Arthritis     Cancer (HCC)     Clotting disorder (HCC)     Colon polyp     COPD (chronic obstructive pulmonary disease) (HCC)     Depression     Disease of thyroid gland     Diverticulitis of colon     GERD (gastroesophageal reflux disease)     Headache(784.0)     HL (hearing loss)     Inflammatory bowel disease     Lung fibrosis (HCC)     mild    Memory loss     Obesity     Osteoporosis     Otitis media     Scoliosis     Sinusitis     Sleep apnea, obstructive     Visual impairment      Past Surgical History:   Procedure Laterality Date    COLONOSCOPY  01/28/2025    HEMORROIDECTOMY      TUBAL LIGATION      VEIN LIGATION AND STRIPPING       Family History   Problem Relation Age of Onset    Arthritis Mother     Heart disease Father     Lung cancer Father     Cancer Father     Glaucoma Father     No Known Problems Sister     No Known Problems Sister     No Known Problems Sister     No Known Problems Maternal Grandmother     No Known Problems Maternal Grandfather     Cancer Paternal Grandmother      COPD Paternal Grandmother     No Known Problems Paternal Grandfather     Breast cancer Maternal Aunt         60s    Breast cancer Paternal Aunt     Prostate cancer Brother     Breast cancer Paternal Aunt       reports that she has been smoking cigarettes. She started smoking about 45 years ago. She has a 45.1 pack-year smoking history. She has never used smokeless tobacco. She reports that she does not currently use alcohol. She reports that she does not use drugs.  Current Outpatient Medications   Medication Instructions    albuterol (PROVENTIL HFA,VENTOLIN HFA) 90 mcg/act inhaler 2 puffs, Inhalation, Every 6 hours PRN    Budeson-Glycopyrrol-Formoterol (Breztri Aerosphere) 160-9-4.8 MCG/ACT AERO 2 puffs, Inhalation, 2 times daily, Rinse mouth after use.    dicyclomine (BENTYL) 10 mg, Oral, 4 times daily (before meals and at bedtime)    lidocaine (LIDODERM) 5 % 1 patch, Topical, Daily, Remove & Discard patch within 12 hours or as directed by MD    nystatin (MYCOSTATIN) powder Topical, 2 times daily    pantoprazole (PROTONIX) 40 mg, Oral, Daily    rifaximin (XIFAXAN) 550 mg, Oral, Every 8 hours scheduled     Allergies   Allergen Reactions    Citalopram Diarrhea    Penicillins Hives    Pregabalin GI Intolerance      Current Outpatient Medications on File Prior to Visit   Medication Sig Dispense Refill    albuterol (PROVENTIL HFA,VENTOLIN HFA) 90 mcg/act inhaler INHALE 2 PUFFS EVERY 6 (SIX) HOURS AS NEEDED FOR WHEEZING OR SHORTNESS OF BREATH 18 g 5    Budeson-Glycopyrrol-Formoterol (Breztri Aerosphere) 160-9-4.8 MCG/ACT AERO Inhale 2 puffs 2 (two) times a day Rinse mouth after use. 10.7 g 0    dicyclomine (BENTYL) 10 mg capsule Take 1 capsule (10 mg total) by mouth 4 (four) times a day (before meals and at bedtime) 120 capsule 3    nystatin (MYCOSTATIN) powder Apply topically 2 (two) times a day 60 g 1    lidocaine (LIDODERM) 5 % Apply 1 patch topically daily Remove & Discard patch within 12 hours or as directed by  "MD 30 patch 0     No current facility-administered medications on file prior to visit.      Social History     Tobacco Use    Smoking status: Some Days     Current packs/day: 0.50     Average packs/day: 1 pack/day for 45.2 years (45.1 ttl pk-yrs)     Types: Cigarettes     Start date: 08/1979     Last attempt to quit: 08/2024    Smokeless tobacco: Never   Vaping Use    Vaping status: Never Used   Substance and Sexual Activity    Alcohol use: Not Currently     Comment: when younger    Drug use: Never    Sexual activity: Not on file        Objective   /80   Pulse (!) 108   Ht 5' 3\" (1.6 m)   Wt 89.4 kg (197 lb)   SpO2 97%   BMI 34.90 kg/m²      Physical Exam  Constitutional:       Appearance: Normal appearance.   HENT:      Head: Normocephalic and atraumatic.   Cardiovascular:      Rate and Rhythm: Normal rate and regular rhythm.   Pulmonary:      Effort: Pulmonary effort is normal. No respiratory distress.      Breath sounds: Normal breath sounds.   Abdominal:      General: Bowel sounds are normal.      Palpations: Abdomen is soft.      Tenderness: There is abdominal tenderness.   Skin:     General: Skin is warm and dry.   Neurological:      Mental Status: She is alert and oriented to person, place, and time.   Psychiatric:         Mood and Affect: Mood normal.         Behavior: Behavior normal.           "

## 2025-04-09 NOTE — H&P (VIEW-ONLY)
Name: Awilda Saul      : 1960      MRN: [id number]  Encounter Provider: Polly Macdonadl PA-C  Encounter Date: 2025   Encounter department: [facility] [place]  :  Assessment & Plan  Irritable bowel syndrome with both constipation and diarrhea    Gastroesophageal reflux disease, unspecified whether esophagitis present    Nausea and vomiting, unspecified vomiting type    Dysphagia, unspecified type    Diarrhea, unspecified type    Abdominal pain, unspecified abdominal location    Patient reports struggling with a long history of abdominal pain, diarrhea (constipation at times as well), bloating, GERD, nausea, vomiting, and also episodes of dysphagia.  She had a colonoscopy with Dr. Dorantes in January which showed 2 polyps removed (1 was a serrated adenoma) amd mild localized erythema in the ileocecal valve; biopsies were completely benign and negative for colitis.  She had a RUQ US which showed a normal gallbladder.     Will plan for EGD with biopsies of the esophagus, stomach, and duodenum to investigate.  Will check celiac serology.  Will check a CT enterography and mesenteric duplex.  Pantoprazole 40mg po daily course x 8 weeks for GERD.  Benefiber daily and low FODMAP diet for IBS.  Will increase Bentyl to 20mg po TID prn abdominal pain.  Will also give a Xifaxan 550mg po TID x 14 days course for IBS-D (note: she reports having significant side effects with antidepressants in the past- thus I do not think she would be a good candidate for a TCA for treatment).  Follow up after the above testing.        History of Present Illness   HPI  Awilda Saul is a pleasant  64 y.o. female who presents to the office for follow up of her gastrointestinal symptoms. Patient reports struggling with a long history of abdominal pain, diarrhea (constipation at times as well), bloating, GERD, nausea, vomiting, and also episodes of dysphagia. She had a colonoscopy with   Jayna in January which showed 2 polyps removed (1 was a serrated adenoma) amd mild localized erythema in the ileocecal valve; biopsies were completely benign and negative for colitis. She had a RUQ US which showed a normal gallbladder. No relief with probiotics/over the counter treatments in the past/antidiarrheals.  She also reports significant side effects with antidepressants in the past and would want to avoid a TCA. No family history of crohn's or celiac disease that she is aware of.    I discussed informed consent with the patient for the EGD. The risks/benefits of the procedure were discussed with the patient. Risks included, but not limited to, infection, bleeding, perforation were discussed. Patient was agreeable.   History obtained from: patient      Medical History Reviewed by provider this encounter:  Meds     .  Past Medical History   Past Medical History:   Diagnosis Date    Allergic     Anemia     Anxiety     Arthritis     Cancer (HCC)     Clotting disorder (HCC)     Colon polyp     COPD (chronic obstructive pulmonary disease) (HCC)     Depression     Disease of thyroid gland     Diverticulitis of colon     GERD (gastroesophageal reflux disease)     Headache(784.0)     HL (hearing loss)     Inflammatory bowel disease     Lung fibrosis (HCC)     mild    Memory loss     Obesity     Osteoporosis     Otitis media     Scoliosis     Sinusitis     Sleep apnea, obstructive     Visual impairment      Past Surgical History:   Procedure Laterality Date    COLONOSCOPY  01/28/2025    HEMORROIDECTOMY      TUBAL LIGATION      VEIN LIGATION AND STRIPPING       Family History   Problem Relation Age of Onset    Arthritis Mother     Heart disease Father     Lung cancer Father     Cancer Father     Glaucoma Father     No Known Problems Sister     No Known Problems Sister     No Known Problems Sister     No Known Problems Maternal Grandmother     No Known Problems Maternal Grandfather     Cancer Paternal Grandmother      COPD Paternal Grandmother     No Known Problems Paternal Grandfather     Breast cancer Maternal Aunt         60s    Breast cancer Paternal Aunt     Prostate cancer Brother     Breast cancer Paternal Aunt       reports that she has been smoking cigarettes. She started smoking about 45 years ago. She has a 45.1 pack-year smoking history. She has never used smokeless tobacco. She reports that she does not currently use alcohol. She reports that she does not use drugs.  Current Outpatient Medications   Medication Instructions    albuterol (PROVENTIL HFA,VENTOLIN HFA) 90 mcg/act inhaler 2 puffs, Inhalation, Every 6 hours PRN    Budeson-Glycopyrrol-Formoterol (Breztri Aerosphere) 160-9-4.8 MCG/ACT AERO 2 puffs, Inhalation, 2 times daily, Rinse mouth after use.    dicyclomine (BENTYL) 10 mg, Oral, 4 times daily (before meals and at bedtime)    lidocaine (LIDODERM) 5 % 1 patch, Topical, Daily, Remove & Discard patch within 12 hours or as directed by MD    nystatin (MYCOSTATIN) powder Topical, 2 times daily    pantoprazole (PROTONIX) 40 mg, Oral, Daily    rifaximin (XIFAXAN) 550 mg, Oral, Every 8 hours scheduled     Allergies   Allergen Reactions    Citalopram Diarrhea    Penicillins Hives    Pregabalin GI Intolerance      Current Outpatient Medications on File Prior to Visit   Medication Sig Dispense Refill    albuterol (PROVENTIL HFA,VENTOLIN HFA) 90 mcg/act inhaler INHALE 2 PUFFS EVERY 6 (SIX) HOURS AS NEEDED FOR WHEEZING OR SHORTNESS OF BREATH 18 g 5    Budeson-Glycopyrrol-Formoterol (Breztri Aerosphere) 160-9-4.8 MCG/ACT AERO Inhale 2 puffs 2 (two) times a day Rinse mouth after use. 10.7 g 0    dicyclomine (BENTYL) 10 mg capsule Take 1 capsule (10 mg total) by mouth 4 (four) times a day (before meals and at bedtime) 120 capsule 3    nystatin (MYCOSTATIN) powder Apply topically 2 (two) times a day 60 g 1    lidocaine (LIDODERM) 5 % Apply 1 patch topically daily Remove & Discard patch within 12 hours or as directed by  "MD 30 patch 0     No current facility-administered medications on file prior to visit.      Social History     Tobacco Use    Smoking status: Some Days     Current packs/day: 0.50     Average packs/day: 1 pack/day for 45.2 years (45.1 ttl pk-yrs)     Types: Cigarettes     Start date: 08/1979     Last attempt to quit: 08/2024    Smokeless tobacco: Never   Vaping Use    Vaping status: Never Used   Substance and Sexual Activity    Alcohol use: Not Currently     Comment: when younger    Drug use: Never    Sexual activity: Not on file        Objective   /80   Pulse (!) 108   Ht 5' 3\" (1.6 m)   Wt 89.4 kg (197 lb)   SpO2 97%   BMI 34.90 kg/m²      Physical Exam  Constitutional:       Appearance: Normal appearance.   HENT:      Head: Normocephalic and atraumatic.   Cardiovascular:      Rate and Rhythm: Normal rate and regular rhythm.   Pulmonary:      Effort: Pulmonary effort is normal. No respiratory distress.      Breath sounds: Normal breath sounds.   Abdominal:      General: Bowel sounds are normal.      Palpations: Abdomen is soft.      Tenderness: There is abdominal tenderness.   Skin:     General: Skin is warm and dry.   Neurological:      Mental Status: She is alert and oriented to person, place, and time.   Psychiatric:         Mood and Affect: Mood normal.         Behavior: Behavior normal.           "

## 2025-04-10 LAB — IGA SERPL-MCNC: 176 MG/DL (ref 66–433)

## 2025-04-10 NOTE — TELEPHONE ENCOUNTER
Keesha from [facility] (from  # [phone]) stating Xifaxan has been approved # [id number]. She stated she will fax over approval.

## 2025-04-11 ENCOUNTER — RESULTS FOLLOW-UP (OUTPATIENT)
Dept: GASTROENTEROLOGY | Facility: CLINIC | Age: 65
End: 2025-04-11

## 2025-04-11 LAB — TTG IGA SER IA-ACNC: <0.4 U/ML (ref ?–10)

## 2025-04-16 ENCOUNTER — HOSPITAL ENCOUNTER (OUTPATIENT)
Dept: CT IMAGING | Facility: CLINIC | Age: 65
Discharge: HOME | End: 2025-04-16
Payer: COMMERCIAL

## 2025-04-16 DIAGNOSIS — R19.7 DIARRHEA, UNSPECIFIED TYPE: ICD-10-CM

## 2025-04-16 DIAGNOSIS — K21.9 GASTROESOPHAGEAL REFLUX DISEASE, UNSPECIFIED WHETHER ESOPHAGITIS PRESENT: ICD-10-CM

## 2025-04-16 DIAGNOSIS — R11.2 NAUSEA AND VOMITING, UNSPECIFIED VOMITING TYPE: ICD-10-CM

## 2025-04-16 PROCEDURE — 74177 CT ABD & PELVIS W/CONTRAST: CPT

## 2025-04-16 RX ADMIN — IOHEXOL 100 ML: 350 INJECTION, SOLUTION INTRAVENOUS at 14:10

## 2025-04-17 ENCOUNTER — ANESTHESIA (OUTPATIENT)
Dept: GASTROENTEROLOGY | Facility: HOSPITAL | Age: 65
End: 2025-04-17
Payer: COMMERCIAL

## 2025-04-17 ENCOUNTER — HOSPITAL ENCOUNTER (OUTPATIENT)
Dept: GASTROENTEROLOGY | Facility: HOSPITAL | Age: 65
Setting detail: OUTPATIENT SURGERY
End: 2025-04-17
Attending: PHYSICIAN ASSISTANT
Payer: COMMERCIAL

## 2025-04-17 ENCOUNTER — ANESTHESIA EVENT (OUTPATIENT)
Dept: GASTROENTEROLOGY | Facility: HOSPITAL | Age: 65
End: 2025-04-17
Payer: COMMERCIAL

## 2025-04-17 VITALS
RESPIRATION RATE: 23 BRPM | BODY MASS INDEX: 35.47 KG/M2 | OXYGEN SATURATION: 99 % | HEART RATE: 69 BPM | WEIGHT: 200.18 LBS | TEMPERATURE: 97.6 F | DIASTOLIC BLOOD PRESSURE: 70 MMHG | SYSTOLIC BLOOD PRESSURE: 117 MMHG | HEIGHT: 63 IN

## 2025-04-17 DIAGNOSIS — K21.9 GASTROESOPHAGEAL REFLUX DISEASE, UNSPECIFIED WHETHER ESOPHAGITIS PRESENT: ICD-10-CM

## 2025-04-17 DIAGNOSIS — R11.2 NAUSEA AND VOMITING, UNSPECIFIED VOMITING TYPE: ICD-10-CM

## 2025-04-17 PROBLEM — J40 TRACHEOBRONCHITIS: Status: RESOLVED | Noted: 2024-08-14 | Resolved: 2025-04-17

## 2025-04-17 PROCEDURE — 43239 EGD BIOPSY SINGLE/MULTIPLE: CPT | Performed by: INTERNAL MEDICINE

## 2025-04-17 PROCEDURE — 43248 EGD GUIDE WIRE INSERTION: CPT | Performed by: INTERNAL MEDICINE

## 2025-04-17 PROCEDURE — 88305 TISSUE EXAM BY PATHOLOGIST: CPT | Performed by: PATHOLOGY

## 2025-04-17 RX ORDER — MOMETASONE FUROATE 1 MG/ML
SOLUTION TOPICAL
COMMUNITY
Start: 2025-04-16

## 2025-04-17 RX ORDER — TRIAMCINOLONE ACETONIDE 1 MG/G
CREAM TOPICAL
COMMUNITY
Start: 2025-04-16

## 2025-04-17 RX ORDER — SODIUM CHLORIDE, SODIUM LACTATE, POTASSIUM CHLORIDE, CALCIUM CHLORIDE 600; 310; 30; 20 MG/100ML; MG/100ML; MG/100ML; MG/100ML
125 INJECTION, SOLUTION INTRAVENOUS CONTINUOUS
Status: DISCONTINUED | OUTPATIENT
Start: 2025-04-17 | End: 2025-04-21 | Stop reason: HOSPADM

## 2025-04-17 RX ORDER — PROPOFOL 10 MG/ML
INJECTION, EMULSION INTRAVENOUS AS NEEDED
Status: DISCONTINUED | OUTPATIENT
Start: 2025-04-17 | End: 2025-04-17

## 2025-04-17 RX ORDER — LIDOCAINE HYDROCHLORIDE 20 MG/ML
INJECTION, SOLUTION EPIDURAL; INFILTRATION; INTRACAUDAL; PERINEURAL AS NEEDED
Status: DISCONTINUED | OUTPATIENT
Start: 2025-04-17 | End: 2025-04-17

## 2025-04-17 RX ORDER — KETOCONAZOLE 20 MG/ML
SHAMPOO, SUSPENSION TOPICAL
COMMUNITY
Start: 2025-04-16

## 2025-04-17 RX ORDER — NYSTATIN AND TRIAMCINOLONE ACETONIDE 100000; 1 [USP'U]/G; MG/G
CREAM TOPICAL
COMMUNITY
Start: 2025-04-16

## 2025-04-17 RX ADMIN — PROPOFOL 40 MG: 10 INJECTION, EMULSION INTRAVENOUS at 09:33

## 2025-04-17 RX ADMIN — PROPOFOL 120 MG: 10 INJECTION, EMULSION INTRAVENOUS at 09:30

## 2025-04-17 RX ADMIN — LIDOCAINE HYDROCHLORIDE 100 MG: 20 INJECTION, SOLUTION EPIDURAL; INFILTRATION; INTRACAUDAL at 09:30

## 2025-04-17 RX ADMIN — SODIUM CHLORIDE, SODIUM LACTATE, POTASSIUM CHLORIDE, AND CALCIUM CHLORIDE 125 ML/HR: .6; .31; .03; .02 INJECTION, SOLUTION INTRAVENOUS at 08:28

## 2025-04-17 RX ADMIN — PROPOFOL 30 MG: 10 INJECTION, EMULSION INTRAVENOUS at 09:36

## 2025-04-17 NOTE — INTERVAL H&P NOTE
H&P reviewed. After examining the patient I find no changes in the patients condition since the H&P had been written.    Vitals:    04/17/25 0820   BP: 110/59   Pulse: 88   Resp: 18   SpO2: 98%

## 2025-04-17 NOTE — PROGRESS NOTES
Name: Awilda Saul      : 1960      MRN: [id number]  Encounter Provider: Nathan Landaverde PA-C  Encounter Date: 2025   Encounter department: [facility] [place]  :  Assessment & Plan  Subcutaneous cyst    Orders:    Ambulatory referral to General Surgery    Epidermoid cyst  Has known epidermoid cyst with sinus to left mid back, bra strap line. Previously I&D and now with chronic recurrent inflammatory flares that cause significant pain. On exam there is no active inflammation or infection. Very small palpable cyst nodule under the sinus and scar from prior I&D. Recommendation made for elliptical excision as definitive treatment. Details of the procedure and associated risks reviewed. Verbal consent obtained.             History of Present Illness   HPI  Awilda Saul is a 64 y.o. female who presents for a cyst on back       Review of Systems   All other systems reviewed and are negative.    Past Medical History   Past Medical History:   Diagnosis Date    Allergic     Anemia     Anxiety     Arthritis     Cancer (HCC)     Clotting disorder (HCC)     Colon polyp     COPD (chronic obstructive pulmonary disease) (HCC)     Depression     Disease of thyroid gland     Diverticulitis of colon     GERD (gastroesophageal reflux disease)     Headache(784.0)     HL (hearing loss)     Inflammatory bowel disease     Lung fibrosis (HCC)     mild    Memory loss     Obesity     Osteoporosis     Otitis media     Scoliosis     Sinusitis     Sleep apnea, obstructive     Visual impairment      Past Surgical History:   Procedure Laterality Date    COLONOSCOPY  2025    HEMORROIDECTOMY      TUBAL LIGATION      VEIN LIGATION AND STRIPPING       Family History   Problem Relation Age of Onset    Arthritis Mother     Heart disease Father     Lung cancer Father     Cancer Father     Glaucoma Father     No Known Problems Sister     No Known Problems Sister     No Known Problems Sister     No Known  Problems Maternal Grandmother     No Known Problems Maternal Grandfather     Cancer Paternal Grandmother     COPD Paternal Grandmother     No Known Problems Paternal Grandfather     Breast cancer Maternal Aunt         60s    Breast cancer Paternal Aunt     Prostate cancer Brother     Breast cancer Paternal Aunt       reports that she has been smoking cigarettes. She started smoking about 45 years ago. She has a 45.1 pack-year smoking history. She has never used smokeless tobacco. She reports that she does not currently use alcohol. She reports that she does not use drugs.  Current Outpatient Medications   Medication Instructions    albuterol (PROVENTIL HFA,VENTOLIN HFA) 90 mcg/act inhaler 2 puffs, Inhalation, Every 6 hours PRN    Budeson-Glycopyrrol-Formoterol (Breztri Aerosphere) 160-9-4.8 MCG/ACT AERO 2 puffs, Inhalation, 2 times daily, Rinse mouth after use.    dicyclomine (BENTYL) 10 mg, Oral, 4 times daily (before meals and at bedtime)    ketoconazole (NIZORAL) 2 % shampoo     mometasone (ELOCON) 0.1 % lotion     nystatin (MYCOSTATIN) powder Topical, 2 times daily    nystatin-triamcinolone (MYCOLOG-II) cream     pantoprazole (PROTONIX) 40 mg, Oral, Daily    rifaximin (XIFAXAN) 550 mg, Oral, Every 8 hours scheduled    triamcinolone (KENALOG) 0.1 % cream      Allergies   Allergen Reactions    Citalopram Diarrhea    Penicillins Hives         Objective   There were no vitals taken for this visit.     Physical Exam  Vitals and nursing note reviewed.   Constitutional:       General: She is not in acute distress.     Appearance: She is well-developed. She is not diaphoretic.   HENT:      Head: Normocephalic and atraumatic.   Eyes:      Conjunctiva/sclera: Conjunctivae normal.      Pupils: Pupils are equal, round, and reactive to light.   Pulmonary:      Effort: No respiratory distress.   Musculoskeletal:         General: Normal range of motion.      Cervical back: Normal range of motion.   Skin:     General: Skin is  warm and dry.      Capillary Refill: Capillary refill takes less than 2 seconds.      Comments: 1 cm epidermoid cyst of left mid back   Neurological:      Mental Status: She is alert and oriented to person, place, and time.   Psychiatric:         Behavior: Behavior normal.       Skin excision    Date/Time: 4/21/2025 11:00 AM    Performed by: Nathan Landaverde PA-C  Authorized by: Nathan Landaverde PA-C  [facility] Protocol:  procedure performed by consultantConsent: Verbal consent obtained.  Risks and benefits: risks, benefits and alternatives were discussed  Consent given by: patient  Patient understanding: patient states understanding of the procedure being performed  Patient identity confirmed: verbally with patient    Procedure Details - Skin Excision:     Number of Lesions:  1  Lesion 1:     Body area:  Trunk    Trunk location:  Back    Malignancy: benign lesion            Final defect size (mm):  15    Repair type:  Linear closure  Lesion 6:      Informed verbal consent obtained.  Positioned prone.  Area prepped with Betadine swab x 3 and draped.  Using aseptic technique local anesthetic administered in a field block utilizing 20 mL of 1% Xylocaine with epinephrine.  Using 15 blade scalpel elliptical incision made around the cyst, sinus, and scar down to full-thickness of the dermis to the subcutaneous fatty tissue.  No significant undermining or extension of the cyst cavity was encountered.  Tissue placed in formalin for pathologic evaluation.  Single hemostatic suture of 4-0 Vicryl used in the wound bed then skin closed with 3-0 nylon vertical mattress sutures x 2.  Tolerated well without immediate complications.  Wound care instructions provided.

## 2025-04-17 NOTE — ANESTHESIA POSTPROCEDURE EVALUATION
Post-Op Assessment Note    CV Status:  Stable    Pain management: adequate       Mental Status:  Awake and sleepy   Hydration Status:  Euvolemic   PONV Controlled:  Controlled   Airway Patency:  Patent     Post Op Vitals Reviewed: Yes    No anethesia notable event occurred.    Staff: CRNA           Last Filed PACU Vitals:  Vitals Value Taken Time   Temp 97.6 °F (36.4 °C) 04/17/25 0944   Pulse 78 04/17/25 0944   /59 04/17/25 0944   Resp 15 04/17/25 0944   SpO2 97 % 04/17/25 0944       Modified Su:     Vitals Value Taken Time   Activity 2 04/17/25 0944   Respiration 2 04/17/25 0944   Circulation 2 04/17/25 0944   Consciousness 2 04/17/25 0944   Oxygen Saturation 2 04/17/25 0944     Modified Su Score: 10

## 2025-04-17 NOTE — ANESTHESIA PREPROCEDURE EVALUATION
Procedure:  EGD    Relevant Problems   ANESTHESIA   (-) History of anesthesia complications      CARDIO   (-) Chest pain   (-) GONZALEZ (dyspnea on exertion)      PULMONARY   (+) COPD (chronic obstructive pulmonary disease) (HCC)   (-) Shortness of breath   (-) Sleep apnea   (-) URI (upper respiratory infection)      Behavioral Health   (+) Tobacco use disorder      Respiratory/Allergy   (+) Pulmonary fibrosis (HCC)        Physical Exam    Airway    Mallampati score: II  TM Distance: >3 FB  Neck ROM: full     Dental       Cardiovascular      Pulmonary      Other Findings  post-pubertal.      Anesthesia Plan  ASA Score- 2     Anesthesia Type- IV sedation with anesthesia with ASA Monitors.         Additional Monitors:     Airway Plan:            Plan Factors-Exercise tolerance (METS): >4 METS.    Chart reviewed. EKG reviewed.  Existing labs reviewed. Patient summary reviewed.                  Induction- intravenous.    Postoperative Plan-         Informed Consent- Anesthetic plan and risks discussed with patient.  I personally reviewed this patient with the CRNA. Discussed and agreed on the Anesthesia Plan with the CRNA..      NPO Status:  Vitals Value Taken Time   Date of last liquid 04/17/25 04/17/25 0820   Time of last liquid 0745 04/17/25 0820   Date of last solid 04/16/25 04/17/25 0820   Time of last solid 2000 04/17/25 0820

## 2025-04-18 ENCOUNTER — TRANSCRIBE ORDERS (OUTPATIENT)
Dept: SURGERY | Facility: CLINIC | Age: 65
End: 2025-04-18

## 2025-04-18 DIAGNOSIS — L72.9 SUBCUTANEOUS CYST: Primary | ICD-10-CM

## 2025-04-21 ENCOUNTER — OFFICE VISIT (OUTPATIENT)
Dept: SURGERY | Facility: CLINIC | Age: 65
End: 2025-04-21
Payer: COMMERCIAL

## 2025-04-21 DIAGNOSIS — K21.9 GASTROESOPHAGEAL REFLUX DISEASE, UNSPECIFIED WHETHER ESOPHAGITIS PRESENT: ICD-10-CM

## 2025-04-21 DIAGNOSIS — R11.2 NAUSEA AND VOMITING, UNSPECIFIED VOMITING TYPE: ICD-10-CM

## 2025-04-21 DIAGNOSIS — L72.0 EPIDERMOID CYST: Primary | ICD-10-CM

## 2025-04-21 DIAGNOSIS — L72.9 SUBCUTANEOUS CYST: ICD-10-CM

## 2025-04-21 PROCEDURE — 88304 TISSUE EXAM BY PATHOLOGIST: CPT | Performed by: PATHOLOGY

## 2025-04-21 PROCEDURE — 11402 EXC TR-EXT B9+MARG 1.1-2 CM: CPT | Performed by: PHYSICIAN ASSISTANT

## 2025-04-21 PROCEDURE — 99202 OFFICE O/P NEW SF 15 MIN: CPT | Performed by: PHYSICIAN ASSISTANT

## 2025-04-21 NOTE — ASSESSMENT & PLAN NOTE
Has known epidermoid cyst with sinus to left mid back, bra strap line. Previously I&D and now with chronic recurrent inflammatory flares that cause significant pain. On exam there is no active inflammation or infection. Very small palpable cyst nodule under the sinus and scar from prior I&D. Recommendation made for elliptical excision as definitive treatment. Details of the procedure and associated risks reviewed. Verbal consent obtained.

## 2025-04-22 PROCEDURE — 88305 TISSUE EXAM BY PATHOLOGIST: CPT | Performed by: PATHOLOGY

## 2025-04-22 RX ORDER — PANTOPRAZOLE SODIUM 40 MG/1
40 TABLET, DELAYED RELEASE ORAL DAILY
Qty: 30 TABLET | Refills: 1 | OUTPATIENT
Start: 2025-04-22

## 2025-04-23 ENCOUNTER — RESULTS FOLLOW-UP (OUTPATIENT)
Age: 65
End: 2025-04-23

## 2025-04-24 ENCOUNTER — NURSE TRIAGE (OUTPATIENT)
Age: 65
End: 2025-04-24

## 2025-04-24 DIAGNOSIS — R11.2 NAUSEA AND VOMITING, UNSPECIFIED VOMITING TYPE: Primary | ICD-10-CM

## 2025-04-24 RX ORDER — ONDANSETRON 4 MG/1
4 TABLET, FILM COATED ORAL EVERY 8 HOURS PRN
Qty: 20 TABLET | Refills: 1 | Status: SHIPPED | OUTPATIENT
Start: 2025-04-24

## 2025-04-24 NOTE — TELEPHONE ENCOUNTER
"FOLLOW UP: provider recommendation for nausea/vomiting    REASON FOR CONVERSATION: Vomiting    SYMPTOMS: nausea, vomiting since Sunday, having diarrhea, vomited once in the last 24 hours, had 1 episode of vomiting today, abdominal cramping that will get worse prior to moving bowels    OTHER: advised to take Bentyl for cramping and diarrhea, avoid food for 48 hours and do liquid diet, has upcoming celiac test on Tuesday     DISPOSITION: Discuss With PCP and Callback by Nurse Today (overriding Home Care)            Reason for Disposition   MILD vomiting with diarrhea    Answer Assessment - Initial Assessment Questions  1. VOMITING SEVERITY: \"How many times have you vomited in the past 24 hours?\"       Once this morning   2. ONSET: \"When did the vomiting begin?\"       Last few days , since Sunday  3. FLUIDS: \"What fluids or food have you vomited up today?\" \"Have you been able to keep any fluids down?\"      No, unable to keep fluids down   4. ABDOMEN PAIN: \"Are your having any abdomen pain?\" If Yes : \"How bad is it and what does it feel like?\" (e.g., crampy, dull, intermittent, constant)       Yes lower abdomen, dull cramping pain,  intermittent   5. DIARRHEA: \"Is there any diarrhea?\" If Yes, ask: \"How many times today?\"       Yes, once today   6. CONTACTS: \"Is there anyone else in the family with the same symptoms?\"       No   7. CAUSE: \"What do you think is causing your vomiting?\"      unsure  8. HYDRATION STATUS: \"Any signs of dehydration?\" (e.g., dry mouth [not only dry lips], too weak to stand) \"When did you last urinate?\"      Denies   9. OTHER SYMPTOMS: \"Do you have any other symptoms?\" (e.g., fever, headache, vertigo, vomiting blood or coffee grounds, recent head injury)      Headaches   10. PREGNANCY: \"Is there any chance you are pregnant?\" \"When was your last menstrual period?\"        Denies    Protocols used: Vomiting-Adult-[place]    "

## 2025-04-25 DIAGNOSIS — R11.2 NAUSEA AND VOMITING, UNSPECIFIED VOMITING TYPE: ICD-10-CM

## 2025-04-25 PROCEDURE — 88304 TISSUE EXAM BY PATHOLOGIST: CPT | Performed by: PATHOLOGY

## 2025-04-25 RX ORDER — ONDANSETRON 4 MG/1
4 TABLET, FILM COATED ORAL EVERY 8 HOURS PRN
Qty: 20 TABLET | Refills: 1 | OUTPATIENT
Start: 2025-04-25

## 2025-04-29 ENCOUNTER — HOSPITAL ENCOUNTER (OUTPATIENT)
Dept: VASCULAR ULTRASOUND | Facility: HOSPITAL | Age: 65
Discharge: HOME | End: 2025-04-29
Attending: PHYSICIAN ASSISTANT
Payer: COMMERCIAL

## 2025-04-29 DIAGNOSIS — R10.9 ABDOMINAL PAIN, UNSPECIFIED ABDOMINAL LOCATION: ICD-10-CM

## 2025-04-29 PROCEDURE — 93975 VASCULAR STUDY: CPT | Performed by: SURGERY

## 2025-04-29 PROCEDURE — 93975 VASCULAR STUDY: CPT

## 2025-04-30 ENCOUNTER — TELEPHONE (OUTPATIENT)
Age: 65
End: 2025-04-30

## 2025-04-30 DIAGNOSIS — R10.9 ABDOMINAL PAIN, UNSPECIFIED ABDOMINAL LOCATION: ICD-10-CM

## 2025-04-30 DIAGNOSIS — R11.2 NAUSEA AND VOMITING, UNSPECIFIED VOMITING TYPE: Primary | ICD-10-CM

## 2025-05-05 ENCOUNTER — OFFICE VISIT (OUTPATIENT)
Dept: SURGERY | Facility: CLINIC | Age: 65
End: 2025-05-05

## 2025-05-05 DIAGNOSIS — D17.24 LIPOMA OF LEFT THIGH: ICD-10-CM

## 2025-05-05 DIAGNOSIS — L72.0 EPIDERMOID CYST: Primary | ICD-10-CM

## 2025-05-05 PROCEDURE — 99024 POSTOP FOLLOW-UP VISIT: CPT | Performed by: PHYSICIAN ASSISTANT

## 2025-05-05 NOTE — ASSESSMENT & PLAN NOTE
Doing well after cyst removal. Incision well healed, sutures removed. Benign path provided. Reports persistent area of intermittent swelling inflammation and pain in the vicinity of the removed cyst of her left mid back. Inspection and palpation unable to confidently identify any additional cysts or lipoma. Options for management including returning to the office during acute flare, follow-up with Dr. Rucker for second opinion, or ultrasound to the area all discussed. Patient elects to wait and come in when irritated.

## 2025-05-05 NOTE — PROGRESS NOTES
Name: Awilda Saul      : 1960      MRN: [id number]  Encounter Provider: Nathan Landaverde PA-C  Encounter Date: 2025   Encounter department: [facility] GENERAL SURGERY [place]  :  Assessment & Plan  Epidermoid cyst  Doing well after cyst removal. Incision well healed, sutures removed. Benign path provided. Reports persistent area of intermittent swelling inflammation and pain in the vicinity of the removed cyst of her left mid back. Inspection and palpation unable to confidently identify any additional cysts or lipoma. Options for management including returning to the office during acute flare, follow-up with Dr. Rucker for second opinion, or ultrasound to the area all discussed. Patient elects to wait and come in when irritated.         Lipoma of left thigh  Has a soft 4 cm rounded lump to subcutaneous tissue of right anterior thigh above the knee. Findings suspicious for a benign lipoma. Nature of the condition explained and options for management including observation for any changes or excision discussed. Patient elects to call back as needed for this.             History of Present Illness   HPI  Awilda Saul is a 64 y.o. female who is here today s/p exc of cyst on back 2025.  Patient sutures were removed and the excision site looks good with some redness with no discharge.  Patient has a lump on her left thigh that she would like looked at .  History obtained from: patient    Review of Systems   All other systems reviewed and are negative.    Past Medical History   Past Medical History:   Diagnosis Date    Allergic     Anemia     Anxiety     Arthritis     Cancer (HCC)     Clotting disorder (HCC)     Colon polyp     COPD (chronic obstructive pulmonary disease) (HCC)     Depression     Disease of thyroid gland     Diverticulitis of colon     GERD (gastroesophageal reflux disease)     Headache(784.0)     HL (hearing loss)     Inflammatory bowel disease     Lung fibrosis (HCC)      mild    Memory loss     Obesity     Osteoporosis     Otitis media     Scoliosis     Sinusitis     Sleep apnea, obstructive     Visual impairment      Past Surgical History:   Procedure Laterality Date    COLONOSCOPY  01/28/2025    HEMORROIDECTOMY      TUBAL LIGATION      VEIN LIGATION AND STRIPPING       Family History   Problem Relation Age of Onset    Arthritis Mother     Heart disease Father     Lung cancer Father     Cancer Father     Glaucoma Father     No Known Problems Sister     No Known Problems Sister     No Known Problems Sister     No Known Problems Maternal Grandmother     No Known Problems Maternal Grandfather     Cancer Paternal Grandmother     COPD Paternal Grandmother     No Known Problems Paternal Grandfather     Breast cancer Maternal Aunt         60s    Breast cancer Paternal Aunt     Prostate cancer Brother     Breast cancer Paternal Aunt       reports that she has been smoking cigarettes. She started smoking about 45 years ago. She has a 45.1 pack-year smoking history. She has never used smokeless tobacco. She reports that she does not currently use alcohol. She reports that she does not use drugs.  Current Outpatient Medications   Medication Instructions    albuterol (PROVENTIL HFA,VENTOLIN HFA) 90 mcg/act inhaler 2 puffs, Inhalation, Every 6 hours PRN    Budeson-Glycopyrrol-Formoterol (Breztri Aerosphere) 160-9-4.8 MCG/ACT AERO 2 puffs, Inhalation, 2 times daily, Rinse mouth after use.    dicyclomine (BENTYL) 10 mg, Oral, 4 times daily (before meals and at bedtime)    ketoconazole (NIZORAL) 2 % shampoo     mometasone (ELOCON) 0.1 % lotion     nystatin (MYCOSTATIN) powder Topical, 2 times daily    nystatin-triamcinolone (MYCOLOG-II) cream     ondansetron (ZOFRAN) 4 mg, Oral, Every 8 hours PRN    pantoprazole (PROTONIX) 40 mg, Oral, Daily    triamcinolone (KENALOG) 0.1 % cream      Allergies   Allergen Reactions    Citalopram Diarrhea    Penicillins Hives         Objective   There were no  vitals taken for this visit.     Physical Exam  Vitals and nursing note reviewed.   Constitutional:       General: She is not in acute distress.     Appearance: She is well-developed. She is not diaphoretic.   HENT:      Head: Normocephalic and atraumatic.   Eyes:      Conjunctiva/sclera: Conjunctivae normal.      Pupils: Pupils are equal, round, and reactive to light.   Pulmonary:      Effort: No respiratory distress.   Musculoskeletal:         General: Normal range of motion.      Cervical back: Normal range of motion.   Skin:     General: Skin is warm and dry.      Capillary Refill: Capillary refill takes less than 2 seconds.      Comments: Incision C/D/I left mid back, no palpable abnormality in the area of her concern near this. 4 cm lump to left thigh suspicious for lipoma.   Neurological:      Mental Status: She is alert and oriented to person, place, and time.   Psychiatric:         Behavior: Behavior normal.

## 2025-05-05 NOTE — ASSESSMENT & PLAN NOTE
Has a soft 4 cm rounded lump to subcutaneous tissue of right anterior thigh above the knee. Findings suspicious for a benign lipoma. Nature of the condition explained and options for management including observation for any changes or excision discussed. Patient elects to call back as needed for this.

## 2025-06-09 ENCOUNTER — OFFICE VISIT (OUTPATIENT)
Dept: FAMILY MEDICINE CLINIC | Facility: CLINIC | Age: 65
End: 2025-06-09
Payer: COMMERCIAL

## 2025-06-09 VITALS
DIASTOLIC BLOOD PRESSURE: 72 MMHG | BODY MASS INDEX: 34.98 KG/M2 | SYSTOLIC BLOOD PRESSURE: 110 MMHG | TEMPERATURE: 96.5 F | HEART RATE: 105 BPM | HEIGHT: 63 IN | WEIGHT: 197.4 LBS | RESPIRATION RATE: 18 BRPM | OXYGEN SATURATION: 97 %

## 2025-06-09 DIAGNOSIS — R79.89 ABNORMAL TSH: ICD-10-CM

## 2025-06-09 DIAGNOSIS — L21.9 SEBORRHEIC DERMATITIS OF SCALP: Primary | ICD-10-CM

## 2025-06-09 DIAGNOSIS — K58.0 IRRITABLE BOWEL SYNDROME WITH DIARRHEA: ICD-10-CM

## 2025-06-09 DIAGNOSIS — J84.10 PULMONARY FIBROSIS (HCC): ICD-10-CM

## 2025-06-09 DIAGNOSIS — Z12.31 ENCOUNTER FOR SCREENING MAMMOGRAM FOR BREAST CANCER: ICD-10-CM

## 2025-06-09 DIAGNOSIS — E66.09 CLASS 2 OBESITY DUE TO EXCESS CALORIES WITHOUT SERIOUS COMORBIDITY WITH BODY MASS INDEX (BMI) OF 35.0 TO 35.9 IN ADULT: ICD-10-CM

## 2025-06-09 DIAGNOSIS — E66.812 CLASS 2 OBESITY DUE TO EXCESS CALORIES WITHOUT SERIOUS COMORBIDITY WITH BODY MASS INDEX (BMI) OF 35.0 TO 35.9 IN ADULT: ICD-10-CM

## 2025-06-09 DIAGNOSIS — M54.50 CHRONIC BILATERAL LOW BACK PAIN WITHOUT SCIATICA: ICD-10-CM

## 2025-06-09 DIAGNOSIS — J43.9 PULMONARY EMPHYSEMA, UNSPECIFIED EMPHYSEMA TYPE (HCC): ICD-10-CM

## 2025-06-09 DIAGNOSIS — E78.2 MIXED HYPERLIPIDEMIA: ICD-10-CM

## 2025-06-09 DIAGNOSIS — G89.29 CHRONIC BILATERAL LOW BACK PAIN WITHOUT SCIATICA: ICD-10-CM

## 2025-06-09 PROCEDURE — 99214 OFFICE O/P EST MOD 30 MIN: CPT | Performed by: FAMILY MEDICINE

## 2025-06-09 RX ORDER — KETOCONAZOLE 20 MG/ML
1 SHAMPOO, SUSPENSION TOPICAL 2 TIMES WEEKLY
Qty: 120 ML | Refills: 3 | Status: SHIPPED | OUTPATIENT
Start: 2025-06-09

## 2025-06-09 RX ORDER — BUDESONIDE, GLYCOPYRROLATE, AND FORMOTEROL FUMARATE 160; 9; 4.8 UG/1; UG/1; UG/1
2 AEROSOL, METERED RESPIRATORY (INHALATION) 2 TIMES DAILY
Qty: 10.7 G | Refills: 2 | Status: SHIPPED | OUTPATIENT
Start: 2025-06-09

## 2025-06-09 NOTE — PROGRESS NOTES
Name: Awilda Saul      : 1960      MRN: [id number]  Encounter Provider: Joe Jacobs DO  Encounter Date: 2025   Encounter department: [facility]  :  Assessment & Plan  Encounter for screening mammogram for breast cancer         Pulmonary emphysema, unspecified emphysema type (HCC)    Orders:    Budeson-Glycopyrrol-Formoterol (Breztri Aerosphere) 160-9-4.8 MCG/ACT AERO; Inhale 2 puffs 2 (two) times a day Rinse mouth after use.    Seborrheic dermatitis of scalp    Orders:    ketoconazole (NIZORAL) 2 % shampoo; Apply 1 Application topically 2 (two) times a week    Abnormal TSH  Repeat TSH T4 numbers and monitor closely    Orders:    Comprehensive metabolic panel; Future    Lipid panel; Future    TSH, 3rd generation with Free T4 reflex; Future    Irritable bowel syndrome with diarrhea    Orders:    CBC and differential; Future    Mixed hyperlipidemia    Orders:    Comprehensive metabolic panel; Future    Lipid panel; Future    TSH, 3rd generation with Free T4 reflex; Future    CBC and differential; Future    Pulmonary fibrosis (HCC)  Note breathing better overall patient notes improvement she will use the Breztri inhaler as needed basis she started taking methylene blue 2 drops in a glass of water and this has been helping her significantly and she feels much younger in general and more active.  Her breathing has improved.  Follow-up with laboratory work and reevaluate in 3 months    Orders:    CBC and differential; Future    Class 2 obesity due to excess calories without serious comorbidity with body mass index (BMI) of 35.0 to 35.9 in adult    Patient working on exercise and diet throughout the summer now follow-up in 3 months         Chronic bilateral low back pain without sciatica  Home stretching program diet exercise reevaluate in 3 months                History of Present Illness   HPI  Review of Systems    Objective   /72 (BP Location: Left arm, Patient  "Position: Sitting, Cuff Size: Large)   Pulse 105   Temp (!) 96.5 °F (35.8 °C) (Tympanic)   Resp 18   Ht 5' 3\" (1.6 m)   Wt 89.5 kg (197 lb 6.4 oz)   SpO2 97%   BMI 34.97 kg/m²      Physical Exam    "

## 2025-06-09 NOTE — ASSESSMENT & PLAN NOTE
Repeat TSH T4 numbers and monitor closely    Orders:    Comprehensive metabolic panel; Future    Lipid panel; Future    TSH, 3rd generation with Free T4 reflex; Future

## 2025-06-09 NOTE — ASSESSMENT & PLAN NOTE
Note breathing better overall patient notes improvement she will use the Breztri inhaler as needed basis she started taking methylene blue 2 drops in a glass of water and this has been helping her significantly and she feels much younger in general and more active.  Her breathing has improved.  Follow-up with laboratory work and reevaluate in 3 months    Orders:    CBC and differential; Future

## 2025-06-10 ENCOUNTER — TELEPHONE (OUTPATIENT)
Dept: FAMILY MEDICINE CLINIC | Facility: CLINIC | Age: 65
End: 2025-06-10

## 2025-06-10 DIAGNOSIS — J41.0 SIMPLE CHRONIC BRONCHITIS (HCC): ICD-10-CM

## 2025-06-10 DIAGNOSIS — J84.10 PULMONARY FIBROSIS (HCC): Primary | ICD-10-CM

## 2025-06-10 RX ORDER — FLUTICASONE FUROATE, UMECLIDINIUM BROMIDE AND VILANTEROL TRIFENATATE 200; 62.5; 25 UG/1; UG/1; UG/1
1 POWDER RESPIRATORY (INHALATION) DAILY
Qty: 60 BLISTER | Refills: 0 | Status: SHIPPED | OUTPATIENT
Start: 2025-06-10 | End: 2025-07-10

## 2025-06-10 NOTE — TELEPHONE ENCOUNTER
Please see message from POD    Patients insurance will not cover the Breztri  Patient insurance will cover Trelegy     Trelegy sent to your RX basket for approval if appropriate

## 2025-06-10 NOTE — TELEPHONE ENCOUNTER
PA for (Breztri Aerosphere) 160-9-4.8 MCG/ACT AERO SUBMITTED to     via    []CMM-KEY:   []Surescripts-Case ID #   []Availity-Auth ID # NDC #   []Faxed to plan   [x]Other website [id number]  []Phone call Case ID #     [x]PA sent as URGENT    All office notes, labs and other pertaining documents and studies sent. Clinical questions answered. Awaiting determination from insurance company.     Turnaround time for your insurance to make a decision on your Prior Authorization can take 7-21 business days.

## 2025-06-10 NOTE — TELEPHONE ENCOUNTER
MIKE Pascual, called with a denial for this PA. Said formulary alternative of Trelegy will be accepted, needs to do a trial and failure of the Trelegy first to be considered for this other medication. Will fax over denial info as well.

## 2025-06-13 ENCOUNTER — APPOINTMENT (OUTPATIENT)
Dept: LAB | Facility: CLINIC | Age: 65
End: 2025-06-13
Payer: COMMERCIAL

## 2025-06-13 DIAGNOSIS — E78.2 MIXED HYPERLIPIDEMIA: ICD-10-CM

## 2025-06-13 DIAGNOSIS — J84.10 PULMONARY FIBROSIS (HCC): ICD-10-CM

## 2025-06-13 DIAGNOSIS — K58.0 IRRITABLE BOWEL SYNDROME WITH DIARRHEA: ICD-10-CM

## 2025-06-13 DIAGNOSIS — R79.89 ABNORMAL TSH: ICD-10-CM

## 2025-06-13 LAB
ALBUMIN SERPL BCG-MCNC: 4.3 G/DL (ref 3.5–5)
ALP SERPL-CCNC: 101 U/L (ref 34–104)
ALT SERPL W P-5'-P-CCNC: 15 U/L (ref 7–52)
ANION GAP SERPL CALCULATED.3IONS-SCNC: 9 MMOL/L (ref 4–13)
AST SERPL W P-5'-P-CCNC: 17 U/L (ref 13–39)
BASOPHILS # BLD AUTO: 0.04 THOUSANDS/ÂΜL (ref 0–0.1)
BASOPHILS NFR BLD AUTO: 0 % (ref 0–1)
BILIRUB SERPL-MCNC: 0.32 MG/DL (ref 0.2–1)
BUN SERPL-MCNC: 16 MG/DL (ref 5–25)
CALCIUM SERPL-MCNC: 9.6 MG/DL (ref 8.4–10.2)
CHLORIDE SERPL-SCNC: 104 MMOL/L (ref 96–108)
CHOLEST SERPL-MCNC: 236 MG/DL (ref ?–200)
CO2 SERPL-SCNC: 26 MMOL/L (ref 21–32)
CREAT SERPL-MCNC: 0.65 MG/DL (ref 0.6–1.3)
EOSINOPHIL # BLD AUTO: 0.36 THOUSAND/ÂΜL (ref 0–0.61)
EOSINOPHIL NFR BLD AUTO: 3 % (ref 0–6)
ERYTHROCYTE [DISTWIDTH] IN BLOOD BY AUTOMATED COUNT: 13.4 % (ref 11.6–15.1)
GFR SERPL CREATININE-BSD FRML MDRD: 95 ML/MIN/1.73SQ M
GLUCOSE P FAST SERPL-MCNC: 116 MG/DL (ref 65–99)
HCT VFR BLD AUTO: 46.6 % (ref 34.8–46.1)
HDLC SERPL-MCNC: 47 MG/DL
HGB BLD-MCNC: 15 G/DL (ref 11.5–15.4)
IMM GRANULOCYTES # BLD AUTO: 0.07 THOUSAND/UL (ref 0–0.2)
IMM GRANULOCYTES NFR BLD AUTO: 1 % (ref 0–2)
LDLC SERPL CALC-MCNC: 152 MG/DL (ref 0–100)
LYMPHOCYTES # BLD AUTO: 3.45 THOUSANDS/ÂΜL (ref 0.6–4.47)
LYMPHOCYTES NFR BLD AUTO: 29 % (ref 14–44)
MCH RBC QN AUTO: 29.5 PG (ref 26.8–34.3)
MCHC RBC AUTO-ENTMCNC: 32.2 G/DL (ref 31.4–37.4)
MCV RBC AUTO: 92 FL (ref 82–98)
MONOCYTES # BLD AUTO: 1.16 THOUSAND/ÂΜL (ref 0.17–1.22)
MONOCYTES NFR BLD AUTO: 10 % (ref 4–12)
NEUTROPHILS # BLD AUTO: 6.99 THOUSANDS/ÂΜL (ref 1.85–7.62)
NEUTS SEG NFR BLD AUTO: 57 % (ref 43–75)
NONHDLC SERPL-MCNC: 189 MG/DL
NRBC BLD AUTO-RTO: 0 /100 WBCS
PLATELET # BLD AUTO: 331 THOUSANDS/UL (ref 149–390)
PMV BLD AUTO: 10.7 FL (ref 8.9–12.7)
POTASSIUM SERPL-SCNC: 4.5 MMOL/L (ref 3.5–5.3)
PROT SERPL-MCNC: 7 G/DL (ref 6.4–8.4)
RBC # BLD AUTO: 5.09 MILLION/UL (ref 3.81–5.12)
SODIUM SERPL-SCNC: 139 MMOL/L (ref 135–147)
TRIGL SERPL-MCNC: 183 MG/DL (ref ?–150)
TSH SERPL DL<=0.05 MIU/L-ACNC: 0.91 UIU/ML (ref 0.45–4.5)
WBC # BLD AUTO: 12.07 THOUSAND/UL (ref 4.31–10.16)

## 2025-06-13 PROCEDURE — 80053 COMPREHEN METABOLIC PANEL: CPT

## 2025-06-13 PROCEDURE — 36415 COLL VENOUS BLD VENIPUNCTURE: CPT

## 2025-06-13 PROCEDURE — 85025 COMPLETE CBC W/AUTO DIFF WBC: CPT

## 2025-06-13 PROCEDURE — 80061 LIPID PANEL: CPT

## 2025-06-13 PROCEDURE — 84443 ASSAY THYROID STIM HORMONE: CPT

## 2025-07-26 DIAGNOSIS — Z00.6 ENCOUNTER FOR EXAMINATION FOR NORMAL COMPARISON OR CONTROL IN CLINICAL RESEARCH PROGRAM: ICD-10-CM

## 2025-07-29 ENCOUNTER — OFFICE VISIT (OUTPATIENT)
Dept: PULMONOLOGY | Facility: CLINIC | Age: 65
End: 2025-07-29
Payer: COMMERCIAL

## 2025-07-29 VITALS
TEMPERATURE: 97.6 F | SYSTOLIC BLOOD PRESSURE: 100 MMHG | OXYGEN SATURATION: 97 % | HEART RATE: 103 BPM | BODY MASS INDEX: 35.97 KG/M2 | HEIGHT: 63 IN | WEIGHT: 203 LBS | DIASTOLIC BLOOD PRESSURE: 62 MMHG

## 2025-07-29 DIAGNOSIS — J43.9 PULMONARY EMPHYSEMA, UNSPECIFIED EMPHYSEMA TYPE (HCC): Primary | ICD-10-CM

## 2025-07-29 DIAGNOSIS — F17.200 TOBACCO USE DISORDER: ICD-10-CM

## 2025-07-29 DIAGNOSIS — E66.812 CLASS 2 OBESITY DUE TO EXCESS CALORIES WITHOUT SERIOUS COMORBIDITY WITH BODY MASS INDEX (BMI) OF 35.0 TO 35.9 IN ADULT: ICD-10-CM

## 2025-07-29 DIAGNOSIS — J84.10 PULMONARY FIBROSIS (HCC): ICD-10-CM

## 2025-07-29 DIAGNOSIS — E66.09 CLASS 2 OBESITY DUE TO EXCESS CALORIES WITHOUT SERIOUS COMORBIDITY WITH BODY MASS INDEX (BMI) OF 35.0 TO 35.9 IN ADULT: ICD-10-CM

## 2025-07-29 DIAGNOSIS — G47.33 OSA (OBSTRUCTIVE SLEEP APNEA): ICD-10-CM

## 2025-07-29 PROCEDURE — 99214 OFFICE O/P EST MOD 30 MIN: CPT | Performed by: INTERNAL MEDICINE

## 2025-07-29 RX ORDER — NICOTINE 21 MG/24HR
1 PATCH, TRANSDERMAL 24 HOURS TRANSDERMAL EVERY 24 HOURS
Qty: 28 PATCH | Refills: 1 | Status: SHIPPED | OUTPATIENT
Start: 2025-07-29 | End: 2025-08-08

## 2025-07-31 DIAGNOSIS — G47.33 OSA (OBSTRUCTIVE SLEEP APNEA): Primary | ICD-10-CM

## 2025-08-05 ENCOUNTER — HOSPITAL ENCOUNTER (OUTPATIENT)
Dept: RADIOLOGY | Facility: MEDICAL CENTER | Age: 65
Discharge: HOME | End: 2025-08-05
Payer: COMMERCIAL

## 2025-08-05 DIAGNOSIS — J84.10 PULMONARY FIBROSIS (HCC): ICD-10-CM

## 2025-08-05 DIAGNOSIS — J43.9 PULMONARY EMPHYSEMA, UNSPECIFIED EMPHYSEMA TYPE (HCC): ICD-10-CM

## 2025-08-05 PROCEDURE — 71250 CT THORAX DX C-: CPT

## 2025-08-08 DIAGNOSIS — F17.200 TOBACCO USE DISORDER: ICD-10-CM

## 2025-08-08 RX ORDER — NICOTINE 21 MG/24HR
1 PATCH, TRANSDERMAL 24 HOURS TRANSDERMAL EVERY 24 HOURS
Qty: 28 PATCH | Refills: 1 | Status: SHIPPED | OUTPATIENT
Start: 2025-08-08

## 2025-08-19 ENCOUNTER — RESULTS FOLLOW-UP (OUTPATIENT)
Dept: PULMONOLOGY | Facility: CLINIC | Age: 65
End: 2025-08-19